# Patient Record
Sex: FEMALE | Race: WHITE | Employment: UNEMPLOYED | URBAN - NONMETROPOLITAN AREA
[De-identification: names, ages, dates, MRNs, and addresses within clinical notes are randomized per-mention and may not be internally consistent; named-entity substitution may affect disease eponyms.]

---

## 2022-11-18 ENCOUNTER — HOSPITAL ENCOUNTER (INPATIENT)
Age: 57
LOS: 1 days | Discharge: HOME | DRG: 390 | End: 2022-11-19
Attending: EMERGENCY MEDICINE | Admitting: INTERNAL MEDICINE
Payer: COMMERCIAL

## 2022-11-18 ENCOUNTER — APPOINTMENT (OUTPATIENT)
Dept: CT IMAGING | Age: 57
DRG: 390 | End: 2022-11-18
Payer: COMMERCIAL

## 2022-11-18 DIAGNOSIS — K56.609 SMALL BOWEL OBSTRUCTION (HCC): Primary | ICD-10-CM

## 2022-11-18 LAB
ABSOLUTE EOS #: <0.03 K/UL (ref 0–0.44)
ABSOLUTE IMMATURE GRANULOCYTE: <0.03 K/UL (ref 0–0.3)
ABSOLUTE LYMPH #: 1.08 K/UL (ref 1.1–3.7)
ABSOLUTE MONO #: 0.76 K/UL (ref 0.1–1.2)
ALBUMIN SERPL-MCNC: 4.7 G/DL (ref 3.5–5.2)
ALBUMIN/GLOBULIN RATIO: 1.7 (ref 1–2.5)
ALP BLD-CCNC: 104 U/L (ref 35–104)
ALT SERPL-CCNC: 9 U/L (ref 5–33)
AMYLASE: 39 U/L (ref 28–100)
ANION GAP SERPL CALCULATED.3IONS-SCNC: 13 MMOL/L (ref 9–17)
AST SERPL-CCNC: 17 U/L
BACTERIA: ABNORMAL
BASOPHILS # BLD: 0 % (ref 0–2)
BASOPHILS ABSOLUTE: 0.04 K/UL (ref 0–0.2)
BILIRUB SERPL-MCNC: 0.4 MG/DL (ref 0.3–1.2)
BILIRUBIN DIRECT: 0.1 MG/DL
BILIRUBIN URINE: NEGATIVE
BILIRUBIN, INDIRECT: 0.3 MG/DL (ref 0–1)
BUN BLDV-MCNC: 13 MG/DL (ref 6–20)
CALCIUM SERPL-MCNC: 10.6 MG/DL (ref 8.6–10.4)
CHLORIDE BLD-SCNC: 98 MMOL/L (ref 98–107)
CO2: 29 MMOL/L (ref 20–31)
CREAT SERPL-MCNC: 1.08 MG/DL (ref 0.5–0.9)
EKG ATRIAL RATE: 58 BPM
EKG P AXIS: 38 DEGREES
EKG P-R INTERVAL: 128 MS
EKG Q-T INTERVAL: 460 MS
EKG QRS DURATION: 86 MS
EKG QTC CALCULATION (BAZETT): 451 MS
EKG R AXIS: 75 DEGREES
EKG T AXIS: 66 DEGREES
EKG VENTRICULAR RATE: 58 BPM
EOSINOPHILS RELATIVE PERCENT: 0 % (ref 1–4)
EPITHELIAL CELLS UA: ABNORMAL /HPF (ref 0–5)
FLU A ANTIGEN: NEGATIVE
FLU B ANTIGEN: NEGATIVE
GFR SERPL CREATININE-BSD FRML MDRD: >60 ML/MIN/1.73M2
GLUCOSE BLD-MCNC: 154 MG/DL (ref 70–99)
GLUCOSE URINE: NEGATIVE
HCT VFR BLD CALC: 42.7 % (ref 36.3–47.1)
HEMOGLOBIN: 14.4 G/DL (ref 11.9–15.1)
IMMATURE GRANULOCYTES: 0 %
KETONES, URINE: NEGATIVE
LACTIC ACID: 1.8 MMOL/L (ref 0.5–2.2)
LEUKOCYTE ESTERASE, URINE: NEGATIVE
LIPASE: 17 U/L (ref 13–60)
LYMPHOCYTES # BLD: 9 % (ref 24–43)
MCH RBC QN AUTO: 30 PG (ref 25.2–33.5)
MCHC RBC AUTO-ENTMCNC: 33.7 G/DL (ref 25.2–33.5)
MCV RBC AUTO: 89 FL (ref 82.6–102.9)
MONOCYTES # BLD: 6 % (ref 3–12)
NITRITE, URINE: NEGATIVE
PDW BLD-RTO: 13.3 % (ref 11.8–14.4)
PH UA: 7.5 (ref 5–6)
PLATELET # BLD: 392 K/UL (ref 138–453)
PMV BLD AUTO: 10.4 FL (ref 8.1–13.5)
POTASSIUM SERPL-SCNC: 3.8 MMOL/L (ref 3.7–5.3)
PROTEIN UA: NEGATIVE
RBC # BLD: 4.8 M/UL (ref 3.95–5.11)
RBC UA: ABNORMAL /HPF (ref 0–4)
SARS-COV-2, RAPID: NOT DETECTED
SEG NEUTROPHILS: 84 % (ref 36–65)
SEGMENTED NEUTROPHILS ABSOLUTE COUNT: 10.12 K/UL (ref 1.5–8.1)
SODIUM BLD-SCNC: 140 MMOL/L (ref 135–144)
SPECIFIC GRAVITY UA: 1.01 (ref 1.01–1.02)
SPECIMEN DESCRIPTION: NORMAL
TOTAL PROTEIN: 7.5 G/DL (ref 6.4–8.3)
TROPONIN, HIGH SENSITIVITY: 9 NG/L (ref 0–14)
URINE HGB: NEGATIVE
UROBILINOGEN, URINE: NORMAL
WBC # BLD: 12 K/UL (ref 3.5–11.3)
WBC UA: ABNORMAL /HPF (ref 0–4)

## 2022-11-18 PROCEDURE — 6360000002 HC RX W HCPCS: Performed by: EMERGENCY MEDICINE

## 2022-11-18 PROCEDURE — 99285 EMERGENCY DEPT VISIT HI MDM: CPT

## 2022-11-18 PROCEDURE — 84484 ASSAY OF TROPONIN QUANT: CPT

## 2022-11-18 PROCEDURE — 94640 AIRWAY INHALATION TREATMENT: CPT

## 2022-11-18 PROCEDURE — 82248 BILIRUBIN DIRECT: CPT

## 2022-11-18 PROCEDURE — 2580000003 HC RX 258: Performed by: EMERGENCY MEDICINE

## 2022-11-18 PROCEDURE — 2709999900 CT ABDOMEN PELVIS W IV CONTRAST

## 2022-11-18 PROCEDURE — 83690 ASSAY OF LIPASE: CPT

## 2022-11-18 PROCEDURE — 99222 1ST HOSP IP/OBS MODERATE 55: CPT | Performed by: SURGERY

## 2022-11-18 PROCEDURE — 6370000000 HC RX 637 (ALT 250 FOR IP)

## 2022-11-18 PROCEDURE — 81001 URINALYSIS AUTO W/SCOPE: CPT

## 2022-11-18 PROCEDURE — 1200000000 HC SEMI PRIVATE

## 2022-11-18 PROCEDURE — 99222 1ST HOSP IP/OBS MODERATE 55: CPT | Performed by: INTERNAL MEDICINE

## 2022-11-18 PROCEDURE — 6360000004 HC RX CONTRAST MEDICATION: Performed by: EMERGENCY MEDICINE

## 2022-11-18 PROCEDURE — 96374 THER/PROPH/DIAG INJ IV PUSH: CPT

## 2022-11-18 PROCEDURE — 83605 ASSAY OF LACTIC ACID: CPT

## 2022-11-18 PROCEDURE — 85025 COMPLETE CBC W/AUTO DIFF WBC: CPT

## 2022-11-18 PROCEDURE — 87635 SARS-COV-2 COVID-19 AMP PRB: CPT

## 2022-11-18 PROCEDURE — 82150 ASSAY OF AMYLASE: CPT

## 2022-11-18 PROCEDURE — 93005 ELECTROCARDIOGRAM TRACING: CPT | Performed by: EMERGENCY MEDICINE

## 2022-11-18 PROCEDURE — 6370000000 HC RX 637 (ALT 250 FOR IP): Performed by: INTERNAL MEDICINE

## 2022-11-18 PROCEDURE — 87804 INFLUENZA ASSAY W/OPTIC: CPT

## 2022-11-18 PROCEDURE — 96375 TX/PRO/DX INJ NEW DRUG ADDON: CPT

## 2022-11-18 PROCEDURE — 6360000002 HC RX W HCPCS: Performed by: INTERNAL MEDICINE

## 2022-11-18 PROCEDURE — 94760 N-INVAS EAR/PLS OXIMETRY 1: CPT

## 2022-11-18 PROCEDURE — 2580000003 HC RX 258: Performed by: INTERNAL MEDICINE

## 2022-11-18 PROCEDURE — 80053 COMPREHEN METABOLIC PANEL: CPT

## 2022-11-18 PROCEDURE — 36415 COLL VENOUS BLD VENIPUNCTURE: CPT

## 2022-11-18 RX ORDER — SODIUM CHLORIDE 0.9 % (FLUSH) 0.9 %
10 SYRINGE (ML) INJECTION EVERY 12 HOURS SCHEDULED
Status: DISCONTINUED | OUTPATIENT
Start: 2022-11-18 | End: 2022-11-19 | Stop reason: HOSPADM

## 2022-11-18 RX ORDER — SODIUM CHLORIDE 0.9 % (FLUSH) 0.9 %
10 SYRINGE (ML) INJECTION PRN
Status: DISCONTINUED | OUTPATIENT
Start: 2022-11-18 | End: 2022-11-19 | Stop reason: HOSPADM

## 2022-11-18 RX ORDER — SODIUM CHLORIDE FOR INHALATION 0.9 %
3 VIAL, NEBULIZER (ML) INHALATION EVERY 8 HOURS PRN
Status: DISCONTINUED | OUTPATIENT
Start: 2022-11-18 | End: 2022-11-19 | Stop reason: HOSPADM

## 2022-11-18 RX ORDER — ENOXAPARIN SODIUM 100 MG/ML
40 INJECTION SUBCUTANEOUS DAILY
Status: DISCONTINUED | OUTPATIENT
Start: 2022-11-18 | End: 2022-11-19 | Stop reason: HOSPADM

## 2022-11-18 RX ORDER — SODIUM CHLORIDE 9 MG/ML
INJECTION, SOLUTION INTRAVENOUS PRN
Status: DISCONTINUED | OUTPATIENT
Start: 2022-11-18 | End: 2022-11-19 | Stop reason: HOSPADM

## 2022-11-18 RX ORDER — 0.9 % SODIUM CHLORIDE 0.9 %
1000 INTRAVENOUS SOLUTION INTRAVENOUS ONCE
Status: COMPLETED | OUTPATIENT
Start: 2022-11-18 | End: 2022-11-18

## 2022-11-18 RX ORDER — LEVALBUTEROL 1.25 MG/.5ML
1.25 SOLUTION, CONCENTRATE RESPIRATORY (INHALATION) EVERY 8 HOURS PRN
Status: DISCONTINUED | OUTPATIENT
Start: 2022-11-18 | End: 2022-11-19 | Stop reason: HOSPADM

## 2022-11-18 RX ORDER — ACETAMINOPHEN 325 MG/1
650 TABLET ORAL EVERY 4 HOURS PRN
Status: DISCONTINUED | OUTPATIENT
Start: 2022-11-18 | End: 2022-11-19 | Stop reason: HOSPADM

## 2022-11-18 RX ORDER — SODIUM CHLORIDE 9 MG/ML
INJECTION, SOLUTION INTRAVENOUS CONTINUOUS
Status: DISCONTINUED | OUTPATIENT
Start: 2022-11-18 | End: 2022-11-19 | Stop reason: HOSPADM

## 2022-11-18 RX ORDER — IPRATROPIUM BROMIDE AND ALBUTEROL SULFATE 2.5; .5 MG/3ML; MG/3ML
1 SOLUTION RESPIRATORY (INHALATION)
Status: DISCONTINUED | OUTPATIENT
Start: 2022-11-18 | End: 2022-11-18

## 2022-11-18 RX ORDER — SODIUM CHLORIDE FOR INHALATION 0.9 %
3 VIAL, NEBULIZER (ML) INHALATION
Status: DISCONTINUED | OUTPATIENT
Start: 2022-11-18 | End: 2022-11-19 | Stop reason: HOSPADM

## 2022-11-18 RX ORDER — ONDANSETRON 2 MG/ML
4 INJECTION INTRAMUSCULAR; INTRAVENOUS EVERY 6 HOURS PRN
Status: DISCONTINUED | OUTPATIENT
Start: 2022-11-18 | End: 2022-11-18

## 2022-11-18 RX ORDER — ONDANSETRON 2 MG/ML
8 INJECTION INTRAMUSCULAR; INTRAVENOUS EVERY 6 HOURS PRN
Status: DISCONTINUED | OUTPATIENT
Start: 2022-11-18 | End: 2022-11-19 | Stop reason: HOSPADM

## 2022-11-18 RX ORDER — FENTANYL CITRATE 50 UG/ML
50 INJECTION, SOLUTION INTRAMUSCULAR; INTRAVENOUS ONCE
Status: COMPLETED | OUTPATIENT
Start: 2022-11-18 | End: 2022-11-18

## 2022-11-18 RX ORDER — ONDANSETRON 2 MG/ML
4 INJECTION INTRAMUSCULAR; INTRAVENOUS ONCE
Status: COMPLETED | OUTPATIENT
Start: 2022-11-18 | End: 2022-11-18

## 2022-11-18 RX ORDER — NICOTINE 21 MG/24HR
1 PATCH, TRANSDERMAL 24 HOURS TRANSDERMAL DAILY
Status: DISCONTINUED | OUTPATIENT
Start: 2022-11-18 | End: 2022-11-19 | Stop reason: HOSPADM

## 2022-11-18 RX ADMIN — SODIUM CHLORIDE 1000 ML: 9 INJECTION, SOLUTION INTRAVENOUS at 09:21

## 2022-11-18 RX ADMIN — IPRATROPIUM BROMIDE AND ALBUTEROL SULFATE 1 AMPULE: .5; 2.5 SOLUTION RESPIRATORY (INHALATION) at 16:18

## 2022-11-18 RX ADMIN — SODIUM CHLORIDE: 9 INJECTION, SOLUTION INTRAVENOUS at 20:39

## 2022-11-18 RX ADMIN — IOPAMIDOL 100 ML: 755 INJECTION, SOLUTION INTRAVENOUS at 09:41

## 2022-11-18 RX ADMIN — FENTANYL CITRATE 50 MCG: 50 INJECTION, SOLUTION INTRAMUSCULAR; INTRAVENOUS at 10:33

## 2022-11-18 RX ADMIN — HYDROMORPHONE HYDROCHLORIDE 1 MG: 1 INJECTION, SOLUTION INTRAMUSCULAR; INTRAVENOUS; SUBCUTANEOUS at 16:37

## 2022-11-18 RX ADMIN — ENOXAPARIN SODIUM 40 MG: 100 INJECTION SUBCUTANEOUS at 14:14

## 2022-11-18 RX ADMIN — PHENOL 1 SPRAY: 1.5 LIQUID ORAL at 20:31

## 2022-11-18 RX ADMIN — ONDANSETRON 4 MG: 2 INJECTION INTRAMUSCULAR; INTRAVENOUS at 09:21

## 2022-11-18 RX ADMIN — SODIUM CHLORIDE: 9 INJECTION, SOLUTION INTRAVENOUS at 14:14

## 2022-11-18 ASSESSMENT — PAIN SCALES - GENERAL
PAINLEVEL_OUTOF10: 7
PAINLEVEL_OUTOF10: 8
PAINLEVEL_OUTOF10: 0
PAINLEVEL_OUTOF10: 5
PAINLEVEL_OUTOF10: 3
PAINLEVEL_OUTOF10: 8

## 2022-11-18 ASSESSMENT — ENCOUNTER SYMPTOMS
CONSTIPATION: 0
EYE PAIN: 0
VOMITING: 1
SHORTNESS OF BREATH: 0
COUGH: 0
NAUSEA: 1
BACK PAIN: 0
BLOOD IN STOOL: 0
DIARRHEA: 0
ABDOMINAL PAIN: 1

## 2022-11-18 ASSESSMENT — PAIN - FUNCTIONAL ASSESSMENT
PAIN_FUNCTIONAL_ASSESSMENT: ACTIVITIES ARE NOT PREVENTED
PAIN_FUNCTIONAL_ASSESSMENT: ACTIVITIES ARE NOT PREVENTED
PAIN_FUNCTIONAL_ASSESSMENT: 0-10

## 2022-11-18 ASSESSMENT — PAIN DESCRIPTION - FREQUENCY
FREQUENCY: CONTINUOUS
FREQUENCY: CONTINUOUS

## 2022-11-18 ASSESSMENT — PAIN DESCRIPTION - LOCATION
LOCATION: ABDOMEN
LOCATION: THROAT
LOCATION: ABDOMEN

## 2022-11-18 ASSESSMENT — PAIN DESCRIPTION - DESCRIPTORS
DESCRIPTORS: DISCOMFORT
DESCRIPTORS: SHARP
DESCRIPTORS: SHARP

## 2022-11-18 ASSESSMENT — PAIN DESCRIPTION - ONSET
ONSET: ON-GOING
ONSET: SUDDEN

## 2022-11-18 ASSESSMENT — PAIN DESCRIPTION - ORIENTATION
ORIENTATION: MID
ORIENTATION: MID

## 2022-11-18 ASSESSMENT — PAIN DESCRIPTION - PAIN TYPE
TYPE: ACUTE PAIN
TYPE: ACUTE PAIN

## 2022-11-18 NOTE — CONSULTS
General Surgery   Consultation        PATIENT NAME: Patrica Castrejon   YOB: 1965    ADMISSION DATE: 11/18/2022  8:38 AM     Admitting Provider: Yasmine Abarca Physician: Kim Ibanez DATE: 11/18/2022    Consult Regarding:  Small bowel obstruction    HISTORY OF PRESENT ILLNESS:  The patient is a 65 y.o. female  who presents with complaints of abdominal pain and emesis. Pt reports while she was eating dinner last night she began to have crampy but sharp mid abdominal pain and soon after began having nausea and non bloody emesis. This persisted overnight and was not improving so she came to ER for evaluation. Prior to yesterday she reports she was in her usual state health. Denies any recent changes in bowel movements, no recent known sick contacts, no prior hx of bowel obstruction. She does report hx of partial hysterectomy several years ago but no other surgical hx. Past Medical History:    History reviewed. No pertinent past medical history. Past Surgical History:    History reviewed. No pertinent surgical history. Medications Prior to Admission:   No medications prior to admission.     Allergies:  Aleve [naproxen] and Sudafed [pseudoephedrine]    Social History:   Social History     Socioeconomic History    Marital status:      Spouse name: Not on file    Number of children: Not on file    Years of education: Not on file    Highest education level: Not on file   Occupational History    Not on file   Tobacco Use    Smoking status: Every Day     Packs/day: 1.00     Years: 25.00     Pack years: 25.00     Types: Cigarettes    Smokeless tobacco: Not on file   Vaping Use    Vaping Use: Never used   Substance and Sexual Activity    Alcohol use: Not Currently    Drug use: Yes     Types: Marijuana [place])    Sexual activity: Not Currently   Other Topics Concern    Not on file   Social History Narrative    Not on file     Social Determinants of Health     Financial Resource Strain: Not on file   Food Insecurity: Not on file   Transportation Needs: Not on file   Physical Activity: Not on file   Stress: Not on file   Social Connections: Not on file   Intimate Partner Violence: Not on file   Housing Stability: Not on file       Family History:   History reviewed. No pertinent family history. REVIEW OF SYSTEMS:    CONSTITUTIONAL:  No recent weight gain/loss. Energy level normal for pt. HEENT:  negative  CARDIOVASCULAR:  No chest pain  GASTROINTESTINAL:  per HPI  GENITOURINARY:  No dysuria  HEMATOLOGIC/LYMPHATIC:  No easy bruising. No history of cancer  ENDOCRINE: negative  Review of systems negative unless above. PHYSICAL EXAM:    VITALS:  BP (!) 180/93   Pulse 60   Temp 98 °F (36.7 °C) (Tympanic)   Resp 17   Ht 5' 5\" (1.651 m)   Wt 125 lb (56.7 kg)   SpO2 97%   BMI 20.80 kg/m²   INTAKE/OUTPUT:     Intake/Output Summary (Last 24 hours) at 11/18/2022 1622  Last data filed at 11/18/2022 1017  Gross per 24 hour   Intake --   Output 450 ml   Net -450 ml       CONSTITUTIONAL:  awake, alert, not distressed   ENT:  normocephalic/atraumatic, without obvious abnormality  NECK:  supple, symmetrical, trachea midline   LUNGS:  CTA bilaterally  CARDIOVASCULAR:  Regular Rate and Rhythm   ABDOMEN: soft, non distended, mild tenderness to palpation in upper abdomen without guarding or rebound, hypoactive bowel sounds. Well healed lower abd transverse incision.     MUSCULOSKELETAL:  negative, there is not obvious somatic dysfunction  NEUROLOGIC:  Mental Status Exam:  Level of Alertness:   alert  Orientation:   oriented to person, place, and time    CBC:   Lab Results   Component Value Date/Time    WBC 12.0 11/18/2022 09:22 AM    RBC 4.80 11/18/2022 09:22 AM    HGB 14.4 11/18/2022 09:22 AM    HCT 42.7 11/18/2022 09:22 AM    MCV 89.0 11/18/2022 09:22 AM    MCH 30.0 11/18/2022 09:22 AM    MCHC 33.7 11/18/2022 09:22 AM    RDW 13.3 11/18/2022 09:22 AM     11/18/2022 09:22 AM    MPV 10.4 11/18/2022 09:22 AM     CMP:    Lab Results   Component Value Date/Time     11/18/2022 09:22 AM    K 3.8 11/18/2022 09:22 AM    CL 98 11/18/2022 09:22 AM    CO2 29 11/18/2022 09:22 AM    BUN 13 11/18/2022 09:22 AM    CREATININE 1.08 11/18/2022 09:22 AM    LABGLOM >60 11/18/2022 09:22 AM    GLUCOSE 154 11/18/2022 09:22 AM    PROT 7.5 11/18/2022 09:22 AM    LABALBU 4.7 11/18/2022 09:22 AM    CALCIUM 10.6 11/18/2022 09:22 AM    BILITOT 0.4 11/18/2022 09:22 AM    ALKPHOS 104 11/18/2022 09:22 AM    AST 17 11/18/2022 09:22 AM    ALT 9 11/18/2022 09:22 AM     BMP:    Lab Results   Component Value Date/Time     11/18/2022 09:22 AM    K 3.8 11/18/2022 09:22 AM    CL 98 11/18/2022 09:22 AM    CO2 29 11/18/2022 09:22 AM    BUN 13 11/18/2022 09:22 AM    LABALBU 4.7 11/18/2022 09:22 AM    CREATININE 1.08 11/18/2022 09:22 AM    CALCIUM 10.6 11/18/2022 09:22 AM    LABGLOM >60 11/18/2022 09:22 AM    GLUCOSE 154 11/18/2022 09:22 AM     Hepatic Function Panel:    Lab Results   Component Value Date/Time    ALKPHOS 104 11/18/2022 09:22 AM    ALT 9 11/18/2022 09:22 AM    AST 17 11/18/2022 09:22 AM    PROT 7.5 11/18/2022 09:22 AM    BILITOT 0.4 11/18/2022 09:22 AM    BILIDIR 0.1 11/18/2022 09:22 AM    IBILI 0.3 11/18/2022 09:22 AM    LABALBU 4.7 11/18/2022 09:22 AM     AMYLASE:    Lab Results   Component Value Date/Time    AMYLASE 39 11/18/2022 09:22 AM     LIPASE:    Lab Results   Component Value Date/Time    LIPASE 17 11/18/2022 09:22 AM       Pertinent Radiology:  CT abd/pel images reviewed and read noted. Dilated loops of bowel with no clear transition point. Distal small bowel appears decompressed. ASSESSMENT   66 yo female presenting with small bowel obstruction. Suspect obstruction secondary to adhesions based on hx. PLAN    Pt does not have acute surgical abdomen. Will attempt conservative management. I have discussed that should she fail conservative management or have worsening may need surgical intervention. She understands and is agreeable to surgery if needed. Keep NPO. [id number] Krystyna Subramanian for some ice chips. Keep NG to LIWS  Symptom control PRN.     XR abd tomorrow to monitor progression        Electronically signed by Chloe Santana DO  on 11/18/2022 at 4:22 PM

## 2022-11-18 NOTE — H&P
HOSPITALIST ADMISSION H&P      REASON FOR ADMISSION:     SBO---jejunum  ESTIMATED LENGTH OF STAY:   > 2 midnights---2-4 days    ATTENDING/ADMITTING PHYSICIAN: Lilly Frausto MD MD  PCP: No primary care provider on file. HISTORY OF PRESENT ILLNESS:      The patient is a 65 y.o. female patient of No primary care provider on file. who presents with sudden onset of abdominal pain--nausea--vomiting---11.18.2022--no exacerbating events of ameliorating event---has had abdominal previously and abdominal surgeries, but no event of the magnitude as this event. CT abdomen-pelvis--11.18.2022--SBO involving the jejunum--multiple dilated loops of bowel up to 3.5 cm. COPD--asthma--tobacco abuse---marijuana smoking    EKD with septal changes, but prior cardiac history denied     HTN----elevated BP most likely due to pain    In ER--NGT placed --> some relief       See below for additional PMH. Patient felk-vnysmqdetg-hdiiuqih-available records reviewed, including, but not limited to,  ER reports--labs--imaging---EKGs--office records---personal notes. History reviewed. See subnote below incorporated by reference herein        History reviewed. See subnote below incorporated by reference   herein    Medications Prior to Admission:    No medications prior to admission. Allergies:    Aleve [naproxen] and Sudafed [pseudoephedrine]    Social History:    reports that she has been smoking cigarettes. She has a 25.00 pack-year smoking history. She does not have any smokeless tobacco history on file. She reports that she does not currently use alcohol. She reports current drug use. Drug: Marijuana [place]).     Family History:   Patient adopted and raised by grandparents---biological parents history unknown---possible black lung---daughter--substance abuse    REVIEW OF SYSTEMS:  See HPI and problem list; otherwise no other new complaints with respect to HEENT, neck, pulmonary, coronary, GI, , endocrine, musculoskeletal, immune system/connective tissue disease, hematologic, neuropsych, skin, lymphatics, or malignancies. PHYSICAL EXAM:  Vitals:  BP (!) 180/93   Pulse 60   Temp 98 °F (36.7 °C) (Tympanic)   Resp 17   Ht 5' 5\" (1.651 m)   Wt 125 lb (56.7 kg)   SpO2 97%   BMI 20.80 kg/m²     HEENT: Normocephalic and Atraumatic  Neck: Supple, No Masses, Tenderness, Nodularity, and No Lymphadenopathy  Chest/Lungs: Clear to Auscultation without Rales, Rhonchi, or Wheezes  Cardiac: Regular Rate and Rhythm  GI/Abdomen:  Bowel Sounds Absent and Tenderness---no localizing or referred tenderness----no rebound  : Not examined  EXT/Skin: No Edema, No Cyanosis, and No Clubbing---multiple tattoos--none infected appearing  Neuro: Alert and Oriented, to Person, to Time, to Place, to Situation, and No Localizing Signs/Symptoms        LABS:    CBC with Differential:    Lab Results   Component Value Date/Time    WBC 12.0 11/18/2022 09:22 AM    RBC 4.80 11/18/2022 09:22 AM    HGB 14.4 11/18/2022 09:22 AM    HCT 42.7 11/18/2022 09:22 AM     11/18/2022 09:22 AM    MCV 89.0 11/18/2022 09:22 AM    MCH 30.0 11/18/2022 09:22 AM    MCHC 33.7 11/18/2022 09:22 AM    RDW 13.3 11/18/2022 09:22 AM    LYMPHOPCT 9 11/18/2022 09:22 AM    MONOPCT 6 11/18/2022 09:22 AM    BASOPCT 0 11/18/2022 09:22 AM    MONOSABS 0.76 11/18/2022 09:22 AM    LYMPHSABS 1.08 11/18/2022 09:22 AM    EOSABS <0.03 11/18/2022 09:22 AM    BASOSABS 0.04 11/18/2022 09:22 AM     BMP:    Lab Results   Component Value Date/Time     11/18/2022 09:22 AM    K 3.8 11/18/2022 09:22 AM    CL 98 11/18/2022 09:22 AM    CO2 29 11/18/2022 09:22 AM    BUN 13 11/18/2022 09:22 AM    LABALBU 4.7 11/18/2022 09:22 AM    CREATININE 1.08 11/18/2022 09:22 AM    CALCIUM 10.6 11/18/2022 09:22 AM    LABGLOM >60 11/18/2022 09:22 AM    GLUCOSE 154 11/18/2022 09:22 AM       ASSESSMENT:      Patient Active Problem List   Diagnosis    Small bowel obstruction ([facility] [address]     Heath Petit  57  WF   [abimael a Janell Patches, NP;   Mariely Prescott, GS]  FULL CODE       [place]  COVID-19--NEGATIVE    Anti-infectives:  ----    SBO---small bowel obstruction---2022---leukocytosis               CT abdomen-pelvis--2022----SBO involving the jejunum---multiple dilated loops                                      jejunum up to 3.5--left adrenal nodule--likely adenoma  ASCVD               EKG----2022---sinus bradycardia---58--old appearing septal infarction  Hypercalcemia---2022----ca = 10.6  [Normal <= 10.4]    COPD  Asthma   HTN  CKD--Stage 2  GERD  Depression--anxiety--insomnia   Marijuana smoking   Tobacco abuse  PMH:    hematochezia--BRBPR,  dysphagia, back pain, rectal pain  PSH:     partial hysterectomy, right shoulder, EGD--biopsies--dilatation--,  x 2,                colonoscopy--, EGD--    Allergies:     Aleve--Naprosyn--dyspnea, Sudafed--pseudoephedrine       Code Status:  FULL CODE  OARRS---2022    PLAN:       SBO---NGT--General Surgery---scant ice chips to moisten mouth     Home medications reviewed  3.       See orders     Note that over 50 minutes was spent in evaluation of the patient, review of the chart and pertinent records, discussion with family/staff, etc    Charisse Agarwal MD MD  3:46 PM  2022

## 2022-11-18 NOTE — ED PROVIDER NOTES
[id number] [facility]  eMERGENCY dEPARTMENT eNCOUnter      Pt Name: Jeffry Rodriguez  MRN: [id number]  [place] 1965  Date of evaluation: 11/18/2022      CHIEF COMPLAINT       Chief Complaint   Patient presents with    Abdominal Pain    Emesis     \"Started last night \"         HISTORY OF PRESENT ILLNESS    Jeffry Rodriguez is a 65 y.o. female who presents with chief complaint of abdominal pain upper abdomen she associate with nausea vomiting she has had some chills no diarrhea she states it feels similar to hiatal hernia pain she has had before but worse she is also had a bit of a cough and body aches has not been able to get back to sleep since last night      REVIEW OF SYSTEMS         Review of Systems   Constitutional:  Positive for activity change, appetite change and chills. Negative for fever. HENT:  Negative for congestion and ear pain. Eyes:  Negative for pain and visual disturbance. Respiratory:  Negative for cough and shortness of breath. Cardiovascular:  Negative for chest pain, palpitations and leg swelling. Gastrointestinal:  Positive for abdominal pain, nausea and vomiting. Negative for blood in stool, constipation and diarrhea. Endocrine: Negative for polydipsia and polyuria. Genitourinary:  Negative for difficulty urinating, dysuria, frequency, vaginal bleeding and vaginal discharge. Musculoskeletal:  Negative for back pain, joint swelling, myalgias, neck pain and neck stiffness. Skin:  Negative for rash. Neurological:  Negative for dizziness, weakness and headaches. Hematological:  Negative for adenopathy. Does not bruise/bleed easily. Psychiatric/Behavioral:  Negative for confusion, self-injury and suicidal ideas. PAST MEDICAL HISTORY    has no past medical history on file. SURGICAL HISTORY      has no past surgical history on file.     CURRENT MEDICATIONS       Previous Medications    No medications on file       ALLERGIES     is allergic to aleve [naproxen] and sudafed [pseudoephedrine]. FAMILY HISTORY     has no family status information on file. family history is not on file. SOCIAL HISTORY      reports that she has been smoking cigarettes. She has a 25.00 pack-year smoking history. She does not have any smokeless tobacco history on file. She reports that she does not currently use alcohol. She reports current drug use. Drug: Marijuana [place]). PHYSICAL EXAM     INITIAL VITALS:  height is 5' 5\" (1.651 m) and weight is 125 lb (56.7 kg). Her tympanic temperature is 97.9 °F (36.6 °C). Her blood pressure is 180/94 (abnormal) and her pulse is 57. Her respiration is 20 and oxygen saturation is 100%. Physical Exam  Constitutional:       Appearance: She is well-developed. She is ill-appearing. Comments: Patient curled in the fetal position actively vomiting   HENT:      Head: Normocephalic and atraumatic. Right Ear: External ear normal.      Left Ear: External ear normal.   Eyes:      Conjunctiva/sclera: Conjunctivae normal.      Pupils: Pupils are equal, round, and reactive to light. Cardiovascular:      Rate and Rhythm: Normal rate and regular rhythm. Pulmonary:      Effort: Pulmonary effort is normal.      Breath sounds: Normal breath sounds. Abdominal:      General: Bowel sounds are normal.      Palpations: Abdomen is soft. Tenderness: There is generalized abdominal tenderness and tenderness in the epigastric area. Musculoskeletal:         General: No tenderness. Cervical back: Normal range of motion. Skin:     General: Skin is warm and dry. Neurological:      General: No focal deficit present. Mental Status: She is alert and oriented to person, place, and time.    Psychiatric:         Behavior: Behavior normal.         DIFFERENTIAL DIAGNOSIS/ MDM:     Abdominal pain we will do work-up including CAT scan    DIAGNOSTIC RESULTS     EKG: All EKG's are interpreted by the Emergency Department Physician who either signs or Co-signs this chart in the absence of a cardiologist.    Sinus bradycardia rate of 58 bpm ID was 128 ms QRS durations 86 ms QT corrected 451 ms axis is 75 there is no acute ST or T wave changes seen  RADIOLOGY:   I directly visualized the following  images and reviewed the radiologist interpretations:     EXAMINATION:   CT OF THE ABDOMEN AND PELVIS WITH CONTRAST 11/18/2022 9:42 am       TECHNIQUE:   CT of the abdomen and pelvis was performed with the administration of   intravenous contrast. Multiplanar reformatted images are provided for review. Automated exposure control, iterative reconstruction, and/or weight based   adjustment of the mA/kV was utilized to reduce the radiation dose to as low   as reasonably achievable. COMPARISON:   None. HISTORY:   ORDERING SYSTEM PROVIDED HISTORY: Upper abdominal pain with nausea vomiting   inability to have a bowel movement   TECHNOLOGIST PROVIDED HISTORY:       Upper abdominal pain with nausea vomiting inability to have a bowel movement   Decision Support Exception - unselect if not a suspected or confirmed   emergency medical condition->Emergency Medical Condition (MA)   Reason for Exam: upper abd pain       FINDINGS:   Lower Chest: The lung bases are clear       Organs: Few scattered tiny hepatic hypodensities are too small to   characterize and are likely benign. No follow-up is recommended. The   gallbladder is normal.  The spleen and pancreas are normal.  There is a 1 cm   left adrenal nodule with Hounsfield unit measurements of 55. Right adrenal   gland is normal.  The kidneys are normal.       GI/Bowel: The stomach appears normal.  There are multiple dilated loops of   jejunum measuring up to 3.5 cm in diameter. No clear transition point is   identified. However, the majority of the ileum and colon are decompressed. Pelvis: The urinary bladder is normal.  Uterus is not visualized. Adnexal   structures appear normal.  No free fluid. Peritoneum/Retroperitoneum: The aorta is normal caliber. No lymphadenopathy       Bones/Soft Tissues: No suspicious osseous lesion           Impression   1. Small-bowel obstruction involving the jejunum. The transition point is   not clearly identified. 2.  Left adrenal nodule with imaging characteristics probably representing an   adenoma but cannot exclude a pheochromocytoma. Recommend biochemical lab   evaluation for pheochromocytoma. If lab values are normal 1 year follow-up   adrenal washout CT is recommended.                ED BEDSIDE ULTRASOUND:       LABS:  Labs Reviewed   CBC WITH AUTO DIFFERENTIAL - Abnormal; Notable for the following components:       Result Value    WBC 12.0 (*)     MCHC 33.7 (*)     Seg Neutrophils 84 (*)     Lymphocytes 9 (*)     Eosinophils % 0 (*)     Segs Absolute 10.12 (*)     Absolute Lymph # 1.08 (*)     All other components within normal limits   COMPREHENSIVE METABOLIC W/ BILI PROFILE W/ REFLEX TO MG - Abnormal; Notable for the following components:    Calcium 10.6 (*)     Creatinine 1.08 (*)     Glucose 154 (*)     All other components within normal limits   URINALYSIS WITH REFLEX TO CULTURE - Abnormal; Notable for the following components:    pH, UA 7.5 (*)     All other components within normal limits   MICROSCOPIC URINALYSIS - Abnormal; Notable for the following components:    Bacteria, UA 1+ (*)     All other components within normal limits   COVID-19, RAPID   RAPID INFLUENZA A/B ANTIGENS   LACTIC ACID   LIPASE   AMYLASE   TROPONIN           EMERGENCY DEPARTMENT COURSE:   Vitals:    Vitals:    11/18/22 0838 11/18/22 0949 11/18/22 1012 11/18/22 1036   BP: (!) 199/82 (!) 213/91 (!) 227/92 (!) 180/94   Pulse: 56 57 55 57   Resp: 22 20 18 20   Temp: 97.9 °F (36.6 °C)      TempSrc: Tympanic      SpO2: 100% 100% 100% 100%   Weight: 125 lb (56.7 kg)      Height: 5' 5\" (1.651 m)        -------------------------  BP: (!) 180/94, Temp: 97.9 °F (36.6 °C), Heart Rate: 57, Resp: 20        Re-evaluation Notes    Reexamined after placing the NG she is much improved large output    CRITICAL CARE:   IP CONSULT TO GENERAL SURGERY  IP CONSULT TO HOSPITALIST        CONSULTS: General surgery was discussed discussed case with Dr. Teresa Brambila he will see the patient patient      PROCEDURES:  None    FINAL IMPRESSION      1. Small bowel obstruction ([facility] [address]          DISPOSITION/PLAN   DISPOSITION Admitted    Condition on Disposition    Stable    PATIENT REFERRED TO:  No follow-up provider specified. DISCHARGE MEDICATIONS:  New Prescriptions    No medications on file       (Please note that portions of this note were completed with a voice recognition program.  Efforts were made to edit the dictations but occasionally words are mis-transcribed.)    Neha Griffin MD,, MD, F.A.A.E.M.   Attending Emergency Physician                           Neha Griffin MD  11/18/22 [id number]

## 2022-11-19 ENCOUNTER — APPOINTMENT (OUTPATIENT)
Dept: GENERAL RADIOLOGY | Age: 57
DRG: 390 | End: 2022-11-19
Payer: COMMERCIAL

## 2022-11-19 VITALS
TEMPERATURE: 98.3 F | WEIGHT: 125 LBS | SYSTOLIC BLOOD PRESSURE: 162 MMHG | HEART RATE: 85 BPM | RESPIRATION RATE: 14 BRPM | DIASTOLIC BLOOD PRESSURE: 83 MMHG | HEIGHT: 65 IN | OXYGEN SATURATION: 99 % | BODY MASS INDEX: 20.83 KG/M2

## 2022-11-19 LAB
ABSOLUTE EOS #: 0.06 K/UL (ref 0–0.44)
ABSOLUTE IMMATURE GRANULOCYTE: <0.03 K/UL (ref 0–0.3)
ABSOLUTE LYMPH #: 1.68 K/UL (ref 1.1–3.7)
ABSOLUTE MONO #: 0.64 K/UL (ref 0.1–1.2)
ANION GAP SERPL CALCULATED.3IONS-SCNC: 10 MMOL/L (ref 9–17)
BASOPHILS # BLD: 1 % (ref 0–2)
BASOPHILS ABSOLUTE: 0.05 K/UL (ref 0–0.2)
BUN BLDV-MCNC: 9 MG/DL (ref 6–20)
BUN/CREAT BLD: 11 (ref 9–20)
CALCIUM SERPL-MCNC: 9.1 MG/DL (ref 8.6–10.4)
CHLORIDE BLD-SCNC: 102 MMOL/L (ref 98–107)
CO2: 28 MMOL/L (ref 20–31)
CREAT SERPL-MCNC: 0.82 MG/DL (ref 0.5–0.9)
EOSINOPHILS RELATIVE PERCENT: 1 % (ref 1–4)
GFR SERPL CREATININE-BSD FRML MDRD: >60 ML/MIN/1.73M2
GLUCOSE BLD-MCNC: 92 MG/DL (ref 70–99)
HCT VFR BLD CALC: 38.8 % (ref 36.3–47.1)
HEMOGLOBIN: 12.8 G/DL (ref 11.9–15.1)
IMMATURE GRANULOCYTES: 0 %
LYMPHOCYTES # BLD: 19 % (ref 24–43)
MCH RBC QN AUTO: 30.2 PG (ref 25.2–33.5)
MCHC RBC AUTO-ENTMCNC: 33 G/DL (ref 25.2–33.5)
MCV RBC AUTO: 91.5 FL (ref 82.6–102.9)
MONOCYTES # BLD: 7 % (ref 3–12)
PDW BLD-RTO: 13.7 % (ref 11.8–14.4)
PLATELET # BLD: 321 K/UL (ref 138–453)
PMV BLD AUTO: 11 FL (ref 8.1–13.5)
POTASSIUM SERPL-SCNC: 3.6 MMOL/L (ref 3.7–5.3)
RBC # BLD: 4.24 M/UL (ref 3.95–5.11)
SEG NEUTROPHILS: 73 % (ref 36–65)
SEGMENTED NEUTROPHILS ABSOLUTE COUNT: 6.47 K/UL (ref 1.5–8.1)
SODIUM BLD-SCNC: 140 MMOL/L (ref 135–144)
WBC # BLD: 8.9 K/UL (ref 3.5–11.3)

## 2022-11-19 PROCEDURE — 6370000000 HC RX 637 (ALT 250 FOR IP): Performed by: INTERNAL MEDICINE

## 2022-11-19 PROCEDURE — 6360000002 HC RX W HCPCS: Performed by: INTERNAL MEDICINE

## 2022-11-19 PROCEDURE — 36415 COLL VENOUS BLD VENIPUNCTURE: CPT

## 2022-11-19 PROCEDURE — 99231 SBSQ HOSP IP/OBS SF/LOW 25: CPT | Performed by: SURGERY

## 2022-11-19 PROCEDURE — 74019 RADEX ABDOMEN 2 VIEWS: CPT

## 2022-11-19 PROCEDURE — 80048 BASIC METABOLIC PNL TOTAL CA: CPT

## 2022-11-19 PROCEDURE — 99239 HOSP IP/OBS DSCHRG MGMT >30: CPT | Performed by: FAMILY MEDICINE

## 2022-11-19 PROCEDURE — 85025 COMPLETE CBC W/AUTO DIFF WBC: CPT

## 2022-11-19 PROCEDURE — 2580000003 HC RX 258: Performed by: INTERNAL MEDICINE

## 2022-11-19 RX ADMIN — SODIUM CHLORIDE: 9 INJECTION, SOLUTION INTRAVENOUS at 10:23

## 2022-11-19 RX ADMIN — ENOXAPARIN SODIUM 40 MG: 100 INJECTION SUBCUTANEOUS at 09:29

## 2022-11-19 RX ADMIN — PHENOL 1 SPRAY: 1.5 LIQUID ORAL at 02:29

## 2022-11-19 RX ADMIN — SODIUM CHLORIDE: 9 INJECTION, SOLUTION INTRAVENOUS at 03:26

## 2022-11-19 ASSESSMENT — PAIN SCALES - GENERAL: PAINLEVEL_OUTOF10: 0

## 2022-11-19 NOTE — DISCHARGE SUMMARY
Hospitalist Discharge Summary    Fariba Ards  :  1965  MRN:  [id number]    Admit date:  2022  Discharge date:  22    Admitting Physician:  Roopa Reagan MD    Discharge Diagnoses:   Patient Active Problem List   Diagnosis    Small bowel obstruction [facility])        Admission Condition:  fair      Discharged Condition:  good    Hospital Course/Treatments   admitted with abdominal pain, pt was seen in ER and admitted with sbo, pt was kept npo and ng was inserted, pt did well bowel sounds heard, pt ng will be pulled, repeat x ray showed no obstruction, pt will be d/c home    Discharge Medications:         Medication List      You have not been prescribed any medications. Consults:  IP CONSULT TO GENERAL SURGERY  IP CONSULT TO HOSPITALIST    Significant Diagnostic Studies:  CT ABDOMEN PELVIS W IV CONTRAST Additional Contrast? None    Result Date: 2022  EXAMINATION: CT OF THE ABDOMEN AND PELVIS WITH CONTRAST 2022 9:42 am TECHNIQUE: CT of the abdomen and pelvis was performed with the administration of intravenous contrast. Multiplanar reformatted images are provided for review. Automated exposure control, iterative reconstruction, and/or weight based adjustment of the mA/kV was utilized to reduce the radiation dose to as low as reasonably achievable. COMPARISON: None. HISTORY: ORDERING SYSTEM PROVIDED HISTORY: Upper abdominal pain with nausea vomiting inability to have a bowel movement TECHNOLOGIST PROVIDED HISTORY: Upper abdominal pain with nausea vomiting inability to have a bowel movement Decision Support Exception - unselect if not a suspected or confirmed emergency medical condition->Emergency Medical Condition (MA) Reason for Exam: upper abd pain FINDINGS: Lower Chest: The lung bases are clear Organs: Few scattered tiny hepatic hypodensities are too small to characterize and are likely benign. No follow-up is recommended.   The gallbladder is normal.  The spleen and pancreas are normal.  There is a 1 cm left adrenal nodule with Hounsfield unit measurements of 55. Right adrenal gland is normal.  The kidneys are normal. GI/Bowel: The stomach appears normal.  There are multiple dilated loops of jejunum measuring up to 3.5 cm in diameter. No clear transition point is identified. However, the majority of the ileum and colon are decompressed. Pelvis: The urinary bladder is normal.  Uterus is not visualized. Adnexal structures appear normal.  No free fluid. Peritoneum/Retroperitoneum: The aorta is normal caliber. No lymphadenopathy Bones/Soft Tissues: No suspicious osseous lesion     1. Small-bowel obstruction involving the jejunum. The transition point is not clearly identified. 2.  Left adrenal nodule with imaging characteristics probably representing an adenoma but cannot exclude a pheochromocytoma. Recommend biochemical lab evaluation for pheochromocytoma. If lab values are normal 1 year follow-up adrenal washout CT is recommended. XR ABDOMEN (2 VIEWS)    Result Date: 11/19/2022  EXAMINATION: TWO X-RAY VIEWS OF THE ABDOMEN 11/19/2022 11:08 am COMPARISON: November 18, 2022 CT HISTORY: ORDERING SYSTEM PROVIDED HISTORY: SBO TECHNOLOGIST PROVIDED HISTORY: SBO Reason for Exam: SBO FINDINGS: Moderate colonic stool. No evidence of bowel obstruction. Enteric tube at the expected location of the proximal stomach. Trace pleural effusions or scarring at the costophrenic angles. Moderate colonic stool and bowel gas. No evidence of small bowel obstruction. Findings are likely significantly improved from recent CT on November 18, 2022. Enteric tube at the expected location of the proximal stomach. Disposition:   home    Discharge Instructions: Activity: activity as tolerated  Diet:  regular diet    Follow up with No primary care provider on file. in 1 weeks.     Signed:  Heath Bumpers, MD  11/19/2022, 11:50 AM    Time spent in discharge of this pt is more than 30 minutes in examination,evaluvation,  counseling and review of medication and discharge plan

## 2022-11-19 NOTE — PLAN OF CARE
Problem: Discharge Planning  Goal: Discharge to home or other facility with appropriate resources  11/18/2022 2355 by Hilario Cade RN  Outcome: Progressing  Flowsheets (Taken 11/18/2022 2026)  Discharge to home or other facility with appropriate resources:   Identify barriers to discharge with patient and caregiver   Arrange for needed discharge resources and transportation as appropriate   Identify discharge learning needs (meds, wound care, etc)   Refer to discharge planning if patient needs post-hospital services based on physician order or complex needs related to functional status, cognitive ability or social support system  11/18/2022 1332 by Jigar Giron RN  Outcome: Progressing     Problem: Pain  Goal: Verbalizes/displays adequate comfort level or baseline comfort level  11/18/2022 2355 by Hilario Cade RN  Outcome: Progressing  11/18/2022 1332 by Jigar Giron RN  Outcome: Progressing     Problem: ABCDS Injury Assessment  Goal: Absence of physical injury  11/18/2022 2355 by Hilario Cade RN  Outcome: Progressing  11/18/2022 1332 by Jigar Giron RN  Outcome: Progressing

## 2022-11-19 NOTE — PROGRESS NOTES
Hospital Day 2    SBO    Pain gone, passing flatus. Wants to go home. BP (!) 142/75   Pulse 67   Temp 97.8 °F (36.6 °C) (Tympanic)   Resp 14   Ht 5' 5\" (1.651 m)   Wt 125 lb (56.7 kg)   SpO2 96%   BMI 20.80 kg/m²     Alert, no distress  Lungs - CTA  Heart- RRR  Abd - soft, active bowel sound    Plain films show resolutions of SBO    IMP/PLAN  1) Clinical and radiologic resolution SBO  2) D/C NG and feed.   If tolerated she may be discharged

## 2022-11-19 NOTE — PLAN OF CARE
Problem: Discharge Planning  Goal: Discharge to home or other facility with appropriate resources  11/19/2022 0952 by Jcarlos Mata RN  Outcome: Progressing  11/18/2022 2355 by Gabriela Parker RN  Outcome: Progressing  Flowsheets (Taken 11/18/2022 2026)  Discharge to home or other facility with appropriate resources:   Identify barriers to discharge with patient and caregiver   Arrange for needed discharge resources and transportation as appropriate   Identify discharge learning needs (meds, wound care, etc)   Refer to discharge planning if patient needs post-hospital services based on physician order or complex needs related to functional status, cognitive ability or social support system     Problem: Pain  Goal: Verbalizes/displays adequate comfort level or baseline comfort level  11/19/2022 0952 by Jcarlos Mata RN  Outcome: Progressing  11/18/2022 2355 by Gabriela Parker RN  Outcome: Progressing     Problem: ABCDS Injury Assessment  Goal: Absence of physical injury  11/19/2022 0952 by Jcarlos Mata RN  Outcome: Progressing  11/18/2022 2355 by Gabriela Parker RN  Outcome: Progressing

## 2022-11-21 NOTE — PROGRESS NOTES
Physician Progress Note      PATIENT:               Silvino Chan  CSN #:                  [id number]  :                       1965  ADMIT DATE:       2022 8:38 AM  [address] DATE:        2022 1:25 PM  RESPONDING  PROVIDER #:        Smith Flanagan MD          QUERY TEXT:    Pt admitted with SBO. Noted documentation of \"Suspect obstruction secondary   to adhesions\" in [address] [place] [address] consult note . If possible, please document in   progress notes and discharge summary:    The medical record reflects the following:  Risk Factors: PMH of partial hysterectomy and hiatal hernia. Clinical Indicators: ED Provider Note : presents with chief complaint of   abdominal pain upper abdomen she associate with nausea, vomiting, and chills -   no diarrhea. she states it feels similar to hiatal hernia pain she has had   before but worse. GS Consult : Presenting with small bowel obstruction. Suspect obstruction secondary to adhesions based on hx. CT Abd/Pelvis :   Small-bowel obstruction involving the jejunum. The transition point is not   clearly identified. Treatment: Conservative management- GS consult, IVFs, NG to LIWS, NPO. Options provided:  -- SBO related to intestinal adhesions  -- SBO unrelated to intestinal adhesions, due to hiatal hernia  -- Defer to [address] [place] [address] consultant documentation regarding SBO secondary to adhesions  -- Other - I will add my own diagnosis  -- Disagree - Not applicable / Not valid  -- Disagree - Clinically unable to determine / Unknown  -- Refer to Clinical Documentation Reviewer    PROVIDER RESPONSE TEXT:    This patient has an SBO related to intestinal adhesions.     Query created by: Beth Lim on 2022 5:39 AM      Electronically signed by:  Smith Flanagan MD 2022 7:54 AM

## 2022-11-22 ENCOUNTER — TELEPHONE (OUTPATIENT)
Dept: FAMILY MEDICINE CLINIC | Age: 57
End: 2022-11-22

## 2022-11-22 NOTE — TELEPHONE ENCOUNTER
PCU folow up/dod 11-19/small bowel obstruction, pt is discharged from clinic so pt is only allowed a 1 time hospital follow up, called and attempted to book pt but unable to book in schedule, pt became very upset do due to being discharged, transferred pt to pt services to discuss her being discharged, please advise of appt when doing [facility] call.

## 2023-01-20 ENCOUNTER — OFFICE VISIT (OUTPATIENT)
Dept: FAMILY MEDICINE CLINIC | Age: 58
End: 2023-01-20
Payer: COMMERCIAL

## 2023-01-20 ENCOUNTER — HOSPITAL ENCOUNTER (OUTPATIENT)
Age: 58
Discharge: HOME | End: 2023-01-20
Payer: COMMERCIAL

## 2023-01-20 VITALS
SYSTOLIC BLOOD PRESSURE: 138 MMHG | HEART RATE: 100 BPM | DIASTOLIC BLOOD PRESSURE: 82 MMHG | WEIGHT: 121 LBS | HEIGHT: 64 IN | OXYGEN SATURATION: 98 % | BODY MASS INDEX: 20.66 KG/M2

## 2023-01-20 DIAGNOSIS — Z72.0 TOBACCO USE: ICD-10-CM

## 2023-01-20 DIAGNOSIS — Z00.00 HEALTHCARE MAINTENANCE: ICD-10-CM

## 2023-01-20 DIAGNOSIS — R73.09 ELEVATED GLUCOSE: ICD-10-CM

## 2023-01-20 DIAGNOSIS — Z87.891 PERSONAL HISTORY OF TOBACCO USE: ICD-10-CM

## 2023-01-20 DIAGNOSIS — F32.A ANXIETY AND DEPRESSION: ICD-10-CM

## 2023-01-20 DIAGNOSIS — F41.9 ANXIETY AND DEPRESSION: ICD-10-CM

## 2023-01-20 DIAGNOSIS — F32.A ANXIETY AND DEPRESSION: Primary | ICD-10-CM

## 2023-01-20 DIAGNOSIS — Z13.9 ENCOUNTER FOR SCREENING INVOLVING SOCIAL DETERMINANTS OF HEALTH (SDOH): ICD-10-CM

## 2023-01-20 DIAGNOSIS — D36.9 TUBULAR ADENOMA: ICD-10-CM

## 2023-01-20 DIAGNOSIS — F41.9 ANXIETY AND DEPRESSION: Primary | ICD-10-CM

## 2023-01-20 DIAGNOSIS — E27.8 ADRENAL NODULE (HCC): ICD-10-CM

## 2023-01-20 PROBLEM — M25.551 PAIN OF BOTH HIP JOINTS: Status: ACTIVE | Noted: 2018-09-12

## 2023-01-20 PROBLEM — K21.9 GASTROESOPHAGEAL REFLUX DISEASE: Status: ACTIVE | Noted: 2017-02-07

## 2023-01-20 PROBLEM — M54.9 BACKACHE: Status: ACTIVE | Noted: 2017-02-07

## 2023-01-20 PROBLEM — J44.9 CHRONIC OBSTRUCTIVE PULMONARY DISEASE (HCC): Status: ACTIVE | Noted: 2017-02-07

## 2023-01-20 PROBLEM — M25.552 PAIN OF BOTH HIP JOINTS: Status: ACTIVE | Noted: 2018-09-12

## 2023-01-20 LAB
ALBUMIN SERPL-MCNC: 4.3 G/DL (ref 3.5–5.2)
ALBUMIN/GLOBULIN RATIO: 1.7 (ref 1–2.5)
ALP BLD-CCNC: 86 U/L (ref 35–104)
ALT SERPL-CCNC: 10 U/L (ref 5–33)
ANION GAP SERPL CALCULATED.3IONS-SCNC: 10 MMOL/L (ref 9–17)
AST SERPL-CCNC: 14 U/L
BILIRUB SERPL-MCNC: 0.2 MG/DL (ref 0.3–1.2)
BUN BLDV-MCNC: 9 MG/DL (ref 6–20)
BUN/CREAT BLD: 10 (ref 9–20)
CALCIUM SERPL-MCNC: 9.7 MG/DL (ref 8.6–10.4)
CHLORIDE BLD-SCNC: 100 MMOL/L (ref 98–107)
CO2: 29 MMOL/L (ref 20–31)
CREAT SERPL-MCNC: 0.87 MG/DL (ref 0.5–0.9)
GFR SERPL CREATININE-BSD FRML MDRD: >60 ML/MIN/1.73M2
GLUCOSE BLD-MCNC: 89 MG/DL (ref 70–99)
POTASSIUM SERPL-SCNC: 4.3 MMOL/L (ref 3.7–5.3)
SODIUM BLD-SCNC: 139 MMOL/L (ref 135–144)
TOTAL PROTEIN: 6.8 G/DL (ref 6.4–8.3)
TSH SERPL DL<=0.05 MIU/L-ACNC: 1.27 UIU/ML (ref 0.3–5)

## 2023-01-20 PROCEDURE — 83835 ASSAY OF METANEPHRINES: CPT

## 2023-01-20 PROCEDURE — 36415 COLL VENOUS BLD VENIPUNCTURE: CPT

## 2023-01-20 PROCEDURE — 83036 HEMOGLOBIN GLYCOSYLATED A1C: CPT

## 2023-01-20 PROCEDURE — 80061 LIPID PANEL: CPT

## 2023-01-20 PROCEDURE — G0296 VISIT TO DETERM LDCT ELIG: HCPCS | Performed by: FAMILY MEDICINE

## 2023-01-20 PROCEDURE — 84443 ASSAY THYROID STIM HORMONE: CPT

## 2023-01-20 PROCEDURE — 80053 COMPREHEN METABOLIC PANEL: CPT

## 2023-01-20 RX ORDER — DULOXETIN HYDROCHLORIDE 30 MG/1
30 CAPSULE, DELAYED RELEASE ORAL DAILY
Qty: 30 CAPSULE | Refills: 1 | Status: SHIPPED | OUTPATIENT
Start: 2023-01-20

## 2023-01-20 SDOH — ECONOMIC STABILITY: FOOD INSECURITY: WITHIN THE PAST 12 MONTHS, YOU WORRIED THAT YOUR FOOD WOULD RUN OUT BEFORE YOU GOT MONEY TO BUY MORE.: SOMETIMES TRUE

## 2023-01-20 SDOH — ECONOMIC STABILITY: FOOD INSECURITY: WITHIN THE PAST 12 MONTHS, THE FOOD YOU BOUGHT JUST DIDN'T LAST AND YOU DIDN'T HAVE MONEY TO GET MORE.: SOMETIMES TRUE

## 2023-01-20 ASSESSMENT — PATIENT HEALTH QUESTIONNAIRE - PHQ9
SUM OF ALL RESPONSES TO PHQ QUESTIONS 1-9: 8
SUM OF ALL RESPONSES TO PHQ9 QUESTIONS 1 & 2: 2
7. TROUBLE CONCENTRATING ON THINGS, SUCH AS READING THE NEWSPAPER OR WATCHING TELEVISION: 1
6. FEELING BAD ABOUT YOURSELF - OR THAT YOU ARE A FAILURE OR HAVE LET YOURSELF OR YOUR FAMILY DOWN: 1
SUM OF ALL RESPONSES TO PHQ QUESTIONS 1-9: 8
SUM OF ALL RESPONSES TO PHQ QUESTIONS 1-9: 8
1. LITTLE INTEREST OR PLEASURE IN DOING THINGS: 1
SUM OF ALL RESPONSES TO PHQ QUESTIONS 1-9: 8
3. TROUBLE FALLING OR STAYING ASLEEP: 1
2. FEELING DOWN, DEPRESSED OR HOPELESS: 1
10. IF YOU CHECKED OFF ANY PROBLEMS, HOW DIFFICULT HAVE THESE PROBLEMS MADE IT FOR YOU TO DO YOUR WORK, TAKE CARE OF THINGS AT HOME, OR GET ALONG WITH OTHER PEOPLE: 0
4. FEELING TIRED OR HAVING LITTLE ENERGY: 1
8. MOVING OR SPEAKING SO SLOWLY THAT OTHER PEOPLE COULD HAVE NOTICED. OR THE OPPOSITE, BEING SO FIGETY OR RESTLESS THAT YOU HAVE BEEN MOVING AROUND A LOT MORE THAN USUAL: 0
5. POOR APPETITE OR OVEREATING: 2
9. THOUGHTS THAT YOU WOULD BE BETTER OFF DEAD, OR OF HURTING YOURSELF: 0

## 2023-01-20 ASSESSMENT — SOCIAL DETERMINANTS OF HEALTH (SDOH): HOW HARD IS IT FOR YOU TO PAY FOR THE VERY BASICS LIKE FOOD, HOUSING, MEDICAL CARE, AND HEATING?: SOMEWHAT HARD

## 2023-01-20 NOTE — PROGRESS NOTES
Patient declines mammogram, pap, hepatitis c screen, HIV screen, shingles vaccine, flu vaccine, and covid vaccine    Patient declines seeing counseling or psychiatry for depression    Patient agreeable to CT lung screening.    Patient agreeable for  to reach out to discuss assistance resources

## 2023-01-20 NOTE — PROGRESS NOTES
ALEX DAVIS [address], [address]                        Telephone [phone]             Fax [phone]       Darek Mead  :  1965  Age:  65 y.o. MRN:  [id number]  Date of visit:  2023       Assessment and Plan:    1. Anxiety and depression  Psychiatry referral was recommended and declined. A referral for counseling was recommended and declined. She states that she has done well with Cymbalta previously. Cymbalta was ordered:  - DULoxetine (CYMBALTA) 30 MG extended release capsule; Take 1 capsule by mouth daily  Dispense: 30 capsule; Refill: 1    - TSH With Reflex Ft4; Future was ordered to be done today. She will be contacted when the results are available. I asked her to return in 3-4 weeks for re-evaluation. 2. Tobacco use  3. Personal history of tobacco use  Discussed with the patient the current USPSTF guidelines released 2021 for screening for lung cancer. For adults aged 48 to [de-identified] years who have a 20 pack-year smoking history and currently smoke or have quit within the past 15 years the grade B recommendation is to:  Screen for lung cancer with low-dose computed tomography (LDCT) every year. Stop screening once a person has not smoked for 15 years or has a health problem that limits life expectancy or the ability to have lung surgery. The patient  reports that she has been smoking cigarettes. She has a 25.00 pack-year smoking history. She does not have any smokeless tobacco history on file. . Discussed with patient the risks and benefits of screening, including over-diagnosis, false positive rate, and total radiation exposure. The patient currently exhibits no signs or symptoms suggestive of lung cancer.   Discussed with patient the importance of compliance with yearly annual lung cancer screenings and willingness to undergo diagnosis and treatment if screening scan is positive. In addition, the patient was counseled regarding the importance of remaining smoke free and/or total smoking cessation. Also reviewed the following if the patient has Medicare that as of February 10, 2022, Medicare only covers LDCT screening in patients aged 51-72 with at least a 20 pack-year smoking history who currently smoke or have quit in the last 15 years  - [place] VISIT TO DISCUSS LUNG CA SCREEN W LDCT  - CT Lung Screen (Annual/Baseline); Future    4. Adrenal nodule ([address]  I reviewed the results of the CT done during her hospitalization in November 2022. 24-hour urine for metanephrines and catecholamines was ordered:  - Metanephrines Urine; Future  - Miscellaneous Lab Test #1; Future    She will be contacted when the results are available. 5. Elevated glucose  Glucose was elevated during her hospitalization in November 2022. Labs were ordered to be done today:  - Comprehensive Metabolic Panel; Future  - Hemoglobin A1C; Future    She will be contacted when the results are available. 6. Tubular adenoma  She is due for a colonoscopy. A referral to general surgery was ordered:  - Ambulatory referral to General Surgery    7. Encounter for screening involving social determinants of health (SDoH)  - [address] [place] [address] Referral for Social Determinants of Health    8. Routine health maintenance  Health maintenance was reviewed with the patient. She has received three doses of the Pfizer Covid-19 vaccine. A bivalent Covid vaccine was recommended. Annual influenza vaccine was recommended. Shingrix was recommended. Lipid panel was recommended. Hepatitis C and HIV screenings were recommended. Follow up instructions were given to the patient:  Return in about 4 weeks (around 2/17/2023) for anxiety and depression.          Subjective:    Tania Sesay is a 65 y.o. female who presents to [place] [address] today (1/20/2023) for follow up/evaluation of:  New Patient (No previous PCP), Mass (Left buttock x 6 years. 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 She has not had a PCP in many years. She reports issues with anxiety and depression. 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 She is not taking any antidepressant medications currently. She states that Cymbalta has worked well for her in the past.  She has not had any screening tests done recently. She states that she is anxious about the potential results. She had labs and a CT done when she was hospitalized in November 2022 for a small bowel obstruction. She has received three doses of the Pfizer Covid-19 vaccine. The most recent dose was 6/23/2022. Immunization history was reviewed:  Immunization History   Administered Date(s) Administered    COVID-19, PFIZER GRAY top, DO NOT Dilute, (age 15 y+), IM, 30 mcg/0.3 mL 06/23/2022    COVID-19, PFIZER PURPLE top, DILUTE for use, (age 15 y+), 30mcg/0.3mL 04/21/2021, 08/20/2021    Influenza Virus Vaccine 10/18/2013    Pneumococcal Polysaccharide ([id number]) 01/29/2013    Tdap (Boostrix, Adacel) 01/29/2013          She has the following problem list:  Patient Active Problem List   Diagnosis    Small bowel obstruction ([facility]    Chronic obstructive pulmonary disease (HCC)    Gastroesophageal reflux disease    Anxiety disorder    Depression    Pain of both hip joints    Backache    Tobacco use        She does not take any prescription medications currently. She is allergic to latex, aleve [naproxen], naproxen sodium, sudafed [pseudoephedrine], and meloxicam.    She is a smoker. She  reports that she has been smoking cigarettes. She has a 25.00 pack-year smoking history. She does not have any smokeless tobacco history on file.       Objective:    Vitals:    01/20/23 1052   BP: 138/82   Site: Right Upper Arm   Position: Sitting   Cuff Size: Medium Adult Pulse: 100   SpO2: 98%   Weight: 121 lb (54.9 kg)   Height: 5' 4\" (1.626 m)     Body mass index is 20.77 kg/m². Well-nourished, well-developed female. She is alert and cooperative. She is anxious, but is otherwise in no acute distress. Neck supple. No adenopathy. Thyroid symmetric, normal size. Chest:  Normal expansion. Clear to auscultation. No rales, rhonchi, or wheezing. Heart sounds are normal.  Regular rate and rhythm without murmur, gallop or rub. Abdomen is soft, non-tender, non-distended. There are no masses palpated. Lower extremities have no edema. Results of labs done 11/8/2022 were reviewed with the patient:   No visits with results within 1 Month(s) from this visit.    Latest known visit with results is:   Admission on 11/18/2022, Discharged on 11/19/2022   Component Date Value Ref Range Status    WBC 11/18/2022 12.0 (A)  3.5 - 11.3 k/uL Final    RBC 11/18/2022 4.80  3.95 - 5.11 m/uL Final    Hemoglobin 11/18/2022 14.4  11.9 - 15.1 g/dL Final    Hematocrit 11/18/2022 42.7  36.3 - 47.1 % Final    MCV 11/18/2022 89.0  82.6 - 102.9 fL Final    MCH 11/18/2022 30.0  25.2 - 33.5 pg Final    MCHC 11/18/2022 33.7 (A)  25.2 - 33.5 g/dL Final    RDW 11/18/2022 13.3  11.8 - 14.4 % Final    Platelets 27/91/2409 392  138 - 453 k/uL Final    MPV 11/18/2022 10.4  8.1 - 13.5 fL Final    Seg Neutrophils 11/18/2022 84 (A)  36 - 65 % Final    Lymphocytes 11/18/2022 9 (A)  24 - 43 % Final    Monocytes 11/18/2022 6  3 - 12 % Final    Eosinophils % 11/18/2022 0 (A)  1 - 4 % Final    Basophils 11/18/2022 0  0 - 2 % Final    Immature Granulocytes 11/18/2022 0  0 % Final    Segs Absolute 11/18/2022 10.12 (A)  1.50 - 8.10 k/uL Final    Absolute Lymph # 11/18/2022 1.08 (A)  1.10 - 3.70 k/uL Final    Absolute Mono # 11/18/2022 0.76  0.10 - 1.20 k/uL Final    Absolute Eos # 11/18/2022 <0.03  0.00 - 0.44 k/uL Final    Basophils Absolute 11/18/2022 0.04  0.00 - 0.20 k/uL Final    Absolute Immature Granulocyte 11/18/2022 <0.03  0.00 - 0.30 k/uL Final    Albumin 11/18/2022 4.7  3.5 - 5.2 g/dL Final    Albumin/Globulin Ratio 11/18/2022 1.7  1.0 - 2.5 Final    Alkaline Phosphatase 11/18/2022 104  35 - 104 U/L Final    ALT 11/18/2022 9  5 - 33 U/L Final    AST 11/18/2022 17  <32 U/L Final    Total Bilirubin 11/18/2022 0.4  0.3 - 1.2 mg/dL Final    Bilirubin, Direct 11/18/2022 0.1  <0.3 mg/dL Final    Bilirubin, Indirect 11/18/2022 0.3  0.00 - 1.00 mg/dL Final    BUN 11/18/2022 13  6 - 20 mg/dL Final    Calcium 11/18/2022 10.6 (A)  8.6 - 10.4 mg/dL Final    Creatinine 11/18/2022 1.08 (A)  0.50 - 0.90 mg/dL Final    Est, Glom Filt Rate 11/18/2022 >60  >60 mL/min/1.73m2 Final    Comment:       Effective Oct 3, 2022        These results are not intended for use in patients <25years of age. eGFR results are calculated without a race factor using the 2021 CKD-EPI equation. Careful clinical correlation is recommended, particularly when comparing to results   calculated using previous equations. The CKD-EPI equation is less accurate in patients with extremes of muscle mass, extra-renal   metabolism of creatine, excessive creatine ingestion, or following therapy that affects   renal tubular secretion. Glucose 11/18/2022 154 (A)  70 - 99 mg/dL Final    Total Protein 11/18/2022 7.5  6.4 - 8.3 g/dL Final    Sodium 11/18/2022 140  135 - 144 mmol/L Final    Potassium 11/18/2022 3.8  3.7 - 5.3 mmol/L Final    Chloride 11/18/2022 98  98 - 107 mmol/L Final    CO2 11/18/2022 29  20 - 31 mmol/L Final    Anion Gap 11/18/2022 13  9 - 17 mmol/L Final    Lactic Acid 11/18/2022 1.8  0.5 - 2.2 mmol/L Final    Lipase 11/18/2022 17  13 - 60 U/L Final    Amylase 11/18/2022 39  28 - 100 U/L Final    Troponin, High Sensitivity 11/18/2022 9  0 - 14 ng/L Final    Comment:       High Sensitivity Troponin values cannot be compared with other Troponin methodologies.         Patients with high levels of Biotin oral intake (i.e >5mg/day) may have falsely decreased   Troponin levels. Samples collected within 8 hours of biotin intake may require additional   information for diagnosis. Ventricular Rate 11/18/2022 58  BPM Final    Atrial Rate 11/18/2022 58  BPM Final    P-R Interval 11/18/2022 128  ms Final    QRS Duration 11/18/2022 86  ms Final    Q-T Interval 11/18/2022 460  ms Final    QTc Calculation (Bazett) 11/18/2022 451  ms Final    P Axis 11/18/2022 38  degrees Final    R Axis 11/18/2022 75  degrees Final    T Axis 11/18/2022 66  degrees Final    Specimen Description 11/18/2022 . NASOPHARYNGEAL SWAB   Final    SARS-CoV-2, Rapid 11/18/2022 Not Detected  Not Detected Final    Comment:       Rapid NAAT:  The specimen is NEGATIVE for SARS-CoV-2, the novel coronavirus associated with   COVID-19. The ID NOW COVID-19 assay is designed to detect the virus that causes COVID-19 in patients   with signs and symptoms of infection who are suspected of COVID-19. An individual without symptoms of COVID-19 and who is not shedding SARS-CoV-2 virus would   expect to have a negative (not detected) result in this assay. Negative results should be treated as presumptive and, if inconsistent with clinical signs   and symptoms or necessary for patient management,  should be tested with an alternative molecular assay. Negative results do not preclude   SARS-CoV-2 infection and   should not be used as the sole basis for patient management decisions. Fact sheet for Healthcare Providers: http://www.yesy.jacek/  Fact sheet for Patients: http://www.stanley-honorio.jacek/        Methodology: Isothermal Nucleic Acid Amplification      Flu A Antigen 11/18/2022 NEGATIVE  NEGATIVE Final    for Influenza A Antigen    Flu B Antigen 11/18/2022 NEGATIVE  NEGATIVE Final    for Influenza B Antigen.     Glucose, Ur 11/18/2022 NEGATIVE  NEGATIVE Final    Bilirubin Urine 11/18/2022 NEGATIVE  NEGATIVE Final    Ketones, Urine 11/18/2022 NEGATIVE NEGATIVE Final    Specific Gravity, UA 11/18/2022 1.010  1.010 - 1.025 Final    Urine Hgb 11/18/2022 NEGATIVE  NEGATIVE Final    pH, UA 11/18/2022 7.5 (A)  5.0 - 6.0 Final    Protein, UA 11/18/2022 NEGATIVE  NEGATIVE Final    Urobilinogen, Urine 11/18/2022 Normal  Normal Final    Nitrite, Urine 11/18/2022 NEGATIVE  NEGATIVE Final    Leukocyte Esterase, Urine 11/18/2022 NEGATIVE  NEGATIVE Final    WBC, UA 11/18/2022 0 TO 4  0 - 4 /HPF Final    RBC, UA 11/18/2022 None  0 - 4 /HPF Final    Epithelial Cells UA 11/18/2022 0 TO 4  0 - 5 /HPF Final    Bacteria, UA 11/18/2022 1+ (A)  None Final    WBC 11/19/2022 8.9  3.5 - 11.3 k/uL Final    RBC 11/19/2022 4.24  3.95 - 5.11 m/uL Final    Hemoglobin 11/19/2022 12.8  11.9 - 15.1 g/dL Final    Hematocrit 11/19/2022 38.8  36.3 - 47.1 % Final    MCV 11/19/2022 91.5  82.6 - 102.9 fL Final    MCH 11/19/2022 30.2  25.2 - 33.5 pg Final    MCHC 11/19/2022 33.0  25.2 - 33.5 g/dL Final    RDW 11/19/2022 13.7  11.8 - 14.4 % Final    Platelets 43/30/6498 321  138 - 453 k/uL Final    MPV 11/19/2022 11.0  8.1 - 13.5 fL Final    Seg Neutrophils 11/19/2022 73 (A)  36 - 65 % Final    Lymphocytes 11/19/2022 19 (A)  24 - 43 % Final    Monocytes 11/19/2022 7  3 - 12 % Final    Eosinophils % 11/19/2022 1  1 - 4 % Final    Basophils 11/19/2022 1  0 - 2 % Final    Immature Granulocytes 11/19/2022 0  0 % Final    Segs Absolute 11/19/2022 6.47  1.50 - 8.10 k/uL Final    Absolute Lymph # 11/19/2022 1.68  1.10 - 3.70 k/uL Final    Absolute Mono # 11/19/2022 0.64  0.10 - 1.20 k/uL Final    Absolute Eos # 11/19/2022 0.06  0.00 - 0.44 k/uL Final    Basophils Absolute 11/19/2022 0.05  0.00 - 0.20 k/uL Final    Absolute Immature Granulocyte 11/19/2022 <0.03  0.00 - 0.30 k/uL Final    Glucose 11/19/2022 92  70 - 99 mg/dL Final    BUN 11/19/2022 9  6 - 20 mg/dL Final    Creatinine 11/19/2022 0.82  0.50 - 0.90 mg/dL Final    Est, Glom Filt Rate 11/19/2022 >60  >60 mL/min/1.73m2 Final    Comment: Effective Oct 3, 2022        These results are not intended for use in patients <25years of age. eGFR results are calculated without a race factor using the 2021 CKD-EPI equation. Careful clinical correlation is recommended, particularly when comparing to results   calculated using previous equations. The CKD-EPI equation is less accurate in patients with extremes of muscle mass, extra-renal   metabolism of creatine, excessive creatine ingestion, or following therapy that affects   renal tubular secretion. Bun/Cre Ratio 11/19/2022 11  9 - 20 Final    Calcium 11/19/2022 9.1  8.6 - 10.4 mg/dL Final    Sodium 11/19/2022 140  135 - 144 mmol/L Final    Potassium 11/19/2022 3.6 (A)  3.7 - 5.3 mmol/L Final    Chloride 11/19/2022 102  98 - 107 mmol/L Final    CO2 11/19/2022 28  20 - 31 mmol/L Final    Anion Gap 11/19/2022 10  9 - 17 mmol/L Final     I reviewed the results of the CT scan done 11/18/2022:  Impression   1. Small-bowel obstruction involving the jejunum. The transition point is   not clearly identified. 2.  Left adrenal nodule with imaging characteristics probably representing an   adenoma but cannot exclude a pheochromocytoma. Recommend biochemical lab   evaluation for pheochromocytoma. If lab values are normal 1 year follow-up   adrenal washout CT is recommended.                 (Please note that portions of this note were completed with a voice-recognition program. Efforts were made to edit the dictation but occasionally words are mis-transcribed.)

## 2023-01-20 NOTE — PATIENT INSTRUCTIONS

## 2023-01-21 LAB
CHOLESTEROL/HDL RATIO: 3.3
CHOLESTEROL: 202 MG/DL
HDLC SERPL-MCNC: 61 MG/DL
LDL CHOLESTEROL: 118 MG/DL (ref 0–130)
TRIGL SERPL-MCNC: 117 MG/DL

## 2023-01-22 ENCOUNTER — HOSPITAL ENCOUNTER (OUTPATIENT)
Age: 58
Setting detail: SPECIMEN
Discharge: HOME | End: 2023-01-22
Payer: COMMERCIAL

## 2023-01-22 LAB
ESTIMATED AVERAGE GLUCOSE: 114 MG/DL
HBA1C MFR BLD: 5.6 % (ref 4–6)

## 2023-01-22 PROCEDURE — 82384 ASSAY THREE CATECHOLAMINES: CPT

## 2023-01-23 ENCOUNTER — TELEPHONE (OUTPATIENT)
Dept: ONCOLOGY | Age: 58
End: 2023-01-23

## 2023-01-23 DIAGNOSIS — E27.8 ADRENAL NODULE (HCC): ICD-10-CM

## 2023-01-23 NOTE — LETTER
1/23/2023        Chemo Florentino  [address] [facility]    Dear Chemo Florentino:    Your healthcare provider has ordered a low dose CT scan of the chest for lung cancer screening. Enclosed you will find information about CT lung screening and smoking cessation resources. If you are unable to keep you appointment for you CT lung screening, please call our scheduling department at [phone]. Keep in mind that CT lung screening does not take the place of smoking cessation. Please do not hesitate to contact me if you have any questions or concerns.     [address] [facility],      [facility]  [phone]

## 2023-01-26 ENCOUNTER — TELEPHONE (OUTPATIENT)
Dept: FAMILY MEDICINE CLINIC | Age: 58
End: 2023-01-26

## 2023-01-26 ENCOUNTER — INITIAL CONSULT (OUTPATIENT)
Dept: SURGERY | Age: 58
End: 2023-01-26
Payer: COMMERCIAL

## 2023-01-26 VITALS
DIASTOLIC BLOOD PRESSURE: 88 MMHG | HEART RATE: 94 BPM | SYSTOLIC BLOOD PRESSURE: 138 MMHG | WEIGHT: 121 LBS | HEIGHT: 64 IN | BODY MASS INDEX: 20.66 KG/M2

## 2023-01-26 DIAGNOSIS — Z87.19 HX OF SMALL BOWEL OBSTRUCTION: ICD-10-CM

## 2023-01-26 DIAGNOSIS — E27.8 ADRENAL NODULE (HCC): Primary | ICD-10-CM

## 2023-01-26 DIAGNOSIS — K59.00 CONSTIPATION, UNSPECIFIED CONSTIPATION TYPE: Primary | ICD-10-CM

## 2023-01-26 PROCEDURE — 99215 OFFICE O/P EST HI 40 MIN: CPT | Performed by: SURGERY

## 2023-01-26 RX ORDER — GABAPENTIN 800 MG/1
800 TABLET ORAL DAILY
COMMUNITY

## 2023-01-26 NOTE — PROGRESS NOTES
Edwardo Koroma is a 65 y.o. female who presents today for follow-up after hospitalization in late November as well as to discuss constipation and schedule colonoscopy. Patient was admitted to the hospital with abdominal pain and had a CT that showed possible small bowel obstruction with a narrowing of the jejunum though there was not really a transition point. She was managed conservatively and very quickly had resolution of her symptoms over less than 24 hours. She has had 1 prior episode several years ago that was similar and also resolved with conservative management. No prior reported abdominal surgical history. She was recently seen back by her PCP and was reporting that she does have a longstanding history of constipation that typically she treats with over-the-counter laxatives and this has been going on for years. Has not had any recent changes in bowel movements. It was noted that she was due for colonoscopy as her last one was just at 5 years ago with polyps removed. She was referred to general surgery for consideration of repeat colonoscopy for screening as well as to further evaluate her constipation. Denies any blood per rectum. States that she will go a day or 2 between bowel movements sometimes and occasionally will use either stool softeners or over-the-counter laxatives to help with bowel movements. No blood per rectum. No unintended weight loss.     Past Medical History:   Diagnosis Date    Anxiety     COPD (chronic obstructive pulmonary disease) ([address]     Depression     Small bowel obstruction ([address]        Past Surgical History:   Procedure Laterality Date     SECTION       SECTION      COLONOSCOPY  2018    one rectal polyp, removed piecemeal, pathology:  tubular adenoma, hemorrhoid, tortuous colon, done at [facility]    ESOPHAGOGASTRODUODENOSCOPY  2018    one pyloric polyp, benign, otherwise unremarkable, done at [facility]    ESOPHAGOGASTRODUODENOSCOPY  03/01/2016    biopsy: chemical gastritis with focal erosive changes, [facility]    PARTIAL HYSTERECTOMY (CERVIX NOT REMOVED) N/A 2000    ovaries not removed, unsure of year- patient reports around year 2000       Current Outpatient Medications   Medication Sig Dispense Refill    gabapentin (NEURONTIN) 800 MG tablet Take 800 mg by mouth daily. DULoxetine (CYMBALTA) 30 MG extended release capsule Take 1 capsule by mouth daily 30 capsule 1     No current facility-administered medications for this visit.        Allergies   Allergen Reactions    Latex Itching    Aleve [Naproxen] Shortness Of Breath    Naproxen Sodium Itching    Sudafed [Pseudoephedrine] Shortness Of Breath    Meloxicam Itching       Family History   Problem Relation Age of Onset    Lung Cancer Father     Colon Cancer Paternal Grandfather        Social History     Socioeconomic History    Marital status:      Spouse name: Not on file    Number of children: Not on file    Years of education: Not on file    Highest education level: Not on file   Occupational History    Not on file   Tobacco Use    Smoking status: Every Day     Packs/day: 1.00     Years: 25.00     Pack years: 25.00     Types: Cigarettes    Smokeless tobacco: Not on file   Vaping Use    Vaping Use: Never used   Substance and Sexual Activity    Alcohol use: Not Currently    Drug use: Yes     Types: Marijuana Lucianne Bars)    Sexual activity: Not Currently   Other Topics Concern    Not on file   Social History Narrative    Not on file     Social Determinants of Health     Financial Resource Strain: Medium Risk    Difficulty of Paying Living Expenses: Somewhat hard   Food Insecurity: Food Insecurity Present    Worried About Running Out of Food in the Last Year: Sometimes true    Ran Out of Food in the Last Year: Sometimes true   Transportation Needs: Not on file   Physical Activity: Not on file   Stress: Not on file   Social Connections: Not on file   Intimate Partner Violence: Not on file   Housing Stability: Not on file       ROS:   Review of Systems - General ROS: negative  Psychological ROS: negative  Ophthalmic ROS: negative  ENT ROS: negative  Respiratory ROS: no cough, shortness of breath, or wheezing  Cardiovascular ROS: no chest pain or dyspnea on exertion  Gastrointestinal ROS: per HPI  Genito-Urinary ROS: no dysuria, trouble voiding, or hematuria  Musculoskeletal ROS: negative  Dermatological ROS: negative      Objective   Vitals:    01/26/23 1257   BP: 138/88   Pulse: 94     General:in no apparent distress and well developed and well nourished  Eyes: No gross abnormalities. Ears, Nose, Throat: hearing grossly normal bilaterally  Neck: neck supple and non tender without mass  Lungs: clear to auscultation without wheezes or rales   Heart: S1S2, no mumurs, RRR  Abdomen: Soft, nondistended, nontender, bowel sounds present  Extremity: negative  Neuro: CN II-XII grossly intact      1. Constipation, unspecified constipation type  Assessment & Plan:  The patient is not currently having any symptoms to suggest any sort of ongoing bowel obstruction. She does have a longstanding history of constipation and is really not had any significant work-up for this. Get a go ahead and get her plan for colonoscopy as she is due for screening and this will also allow us to further evaluate the colon with biopsies. Risks of the procedure including bleeding, infection, perforation, need for the surgery and anesthesia risk are discussed and consent is obtained.   2. Hx of small bowel obstruction         (Please note that portions of this note were completed with a voice recognition program.  Efforts were made to edit the dictations but occasionally words are mis-transcribed.)

## 2023-01-26 NOTE — TELEPHONE ENCOUNTER
----- Message from Tawnya Garcia MD sent at 1/24/2023  4:08 PM EST -----  Please advise Christian Celi that the lab results have no significant abnormalities. She should have a repeat CT of the abdomen and pelvis in November to re-evaluate the nodule on the left adrenal gland. Please order. Thank you.

## 2023-01-26 NOTE — ASSESSMENT & PLAN NOTE
The patient is not currently having any symptoms to suggest any sort of ongoing bowel obstruction. She does have a longstanding history of constipation and is really not had any significant work-up for this. Get a go ahead and get her plan for colonoscopy as she is due for screening and this will also allow us to further evaluate the colon with biopsies. Risks of the procedure including bleeding, infection, perforation, need for the surgery and anesthesia risk are discussed and consent is obtained.

## 2023-01-27 LAB
CATECHOL/URINE INTERP: NORMAL
CREATININE URINE /24 HR: 759 MG/D (ref 500–1400)
CREATININE URINE /VOLUME: 69 MG/DL
DOPAMINE 24 HOUR URINE: 106 UG/D (ref 71–485)
DOPAMINE, (UG/L): 96 UG/L
DOPAMINE, UR/G CRT: 139 UG/G CRT (ref 0–250)
EPINEPHRINE 24 HOUR URINE: 6 UG/D (ref 1–14)
EPINEPHRINE, (UG/L): 5 UG/L
EPINEPHRINE, UR/G CRT: 7 UG/G CRT (ref 0–20)
HOURS COLLECTED: 24
NOREPINEPHRINE 24 HOUR URINE: 33 UG/D (ref 14–120)
NOREPINEPHRINE URINE MCG/GCR: 43 UG/G CRT (ref 0–45)
NOREPINEPHRINE, (UG/L): 30 UG/L
URINE VOLUME: 1100

## 2023-02-02 ENCOUNTER — HOSPITAL ENCOUNTER (OUTPATIENT)
Dept: CT IMAGING | Age: 58
Discharge: HOME | End: 2023-02-04
Payer: MEDICARE

## 2023-02-02 DIAGNOSIS — Z87.891 PERSONAL HISTORY OF TOBACCO USE: ICD-10-CM

## 2023-02-02 PROCEDURE — 71271 CT THORAX LUNG CANCER SCR C-: CPT

## 2023-02-08 ENCOUNTER — TELEPHONE (OUTPATIENT)
Dept: FAMILY MEDICINE CLINIC | Age: 58
End: 2023-02-08

## 2023-02-08 DIAGNOSIS — R91.1 LUNG NODULE: Primary | ICD-10-CM

## 2023-02-08 NOTE — DISCHARGE INSTRUCTIONS
Discharge Instructions for Colonoscopy     Colonoscopy is a visual exam of the lining of the large intestine, also called the bowel or colon, with a colonoscope. A colonoscope is a flexible tube with a light and a viewing device. It allows the doctor to view the inside of the colon through a tiny video camera. Colonoscopy is performed for many reasons: unexplained anemia , pain, diarrhea , bloody stools, cancer screening, among many other reasons. Complications from a colonoscopy are rare. Some possible serious complications include perforated bowel (which might require surgery) and bleeding (which could require blood transfusion ). Minor complications include bloating, gas, and cramping that can last for 1-2 days after the procedure. Because air is put into your colon during the procedure, it is normal to pass large amounts of air from your rectum. You may not have a bowel movement for 1-3 days after the procedure. What You Will Need   Someone to drive you home after the procedure    Steps to [facility] when you get home. Because the sedative will make you drowsy, don't drive, operate machinery, or make important decisions the day of the procedure. Feelings of bloating, gas, or cramping may persist for 24 hours. Diet    Try sips of water first. If tolerated, resume regular diet or the diet recommended by your physician. Do not drink alcohol for 24 hours. Physical Activity    Ask your doctor when you will be able to return to work. Do not drive, operate heavy machinery, or do activities that require coordination or balance for 24 hours. Otherwise, return to your normal routine as soon as you are comfortable to do so, which is usually the next day after the procedure. Medications    When taking medications, it's important to: Take your medication as directednot more, not less, not at a different time.    Do not stop taking them without consulting your healthcare provider. Don't share them with anyone else. Know what effects and side effects to expect, and report them to your healthcare provider. If you are taking more than one drug, even if it is an over-the-counter medication, herb, or dietary supplement, be sure to check with a physician or pharmacist about drug interactions. Plan ahead for refills so you don't run out. Lifestyle Changes    The results of your colonoscopy will determine if any lifestyle changes are necessary. Follow-up   The doctor will usually give you a preliminary report after the medication wears off and you are more alert. The results from a biopsy can take as long as 1-2 weeks to be completed. Schedule a follow-up appointment as directed by your doctor. You should schedule a follow-up colonoscopy as recommended by your doctor. Call Your Doctor If Any of the Following Occurs   Monitor your recovery once you leave the hospital. As soon as you have a problem, alert your doctor. If any of the following occur, call your doctor:   Bleeding from your rectum; notify your doctor if you pass a teaspoonful or more of blood   Black, tarry stools   Severe abdominal pain   Hard, swollen abdomen   Signs of infection, including fever or chills   Inability to pass gas or stool   Coughing, shortness of breath, chest pain, severe nausea or vomiting   In case of an emergency, call 911 immediately.

## 2023-02-08 NOTE — H&P
Stacey Trujillo is a 65 y.o. female who presents today for follow-up after hospitalization in late November as well as to discuss constipation and schedule colonoscopy. Patient was admitted to the hospital with abdominal pain and had a CT that showed possible small bowel obstruction with a narrowing of the jejunum though there was not really a transition point. She was managed conservatively and very quickly had resolution of her symptoms over less than 24 hours. She has had 1 prior episode several years ago that was similar and also resolved with conservative management. No prior reported abdominal surgical history. She was recently seen back by her PCP and was reporting that she does have a longstanding history of constipation that typically she treats with over-the-counter laxatives and this has been going on for years. Has not had any recent changes in bowel movements. It was noted that she was due for colonoscopy as her last one was just at 5 years ago with polyps removed. She was referred to general surgery for consideration of repeat colonoscopy for screening as well as to further evaluate her constipation. Denies any blood per rectum. States that she will go a day or 2 between bowel movements sometimes and occasionally will use either stool softeners or over-the-counter laxatives to help with bowel movements. No blood per rectum. No unintended weight loss.      Past Medical History        Past Medical History:   Diagnosis Date    Anxiety      COPD (chronic obstructive pulmonary disease) ([address]      Depression      Small bowel obstruction ([address]              Past Surgical History         Past Surgical History:   Procedure Laterality Date     SECTION         SECTION        COLONOSCOPY   2018     one rectal polyp, removed piecemeal, pathology:  tubular adenoma, hemorrhoid, tortuous colon, done at [facility]    ESOPHAGOGASTRODUODENOSCOPY   2018 one pyloric polyp, benign, otherwise unremarkable, done at [facility]    ESOPHAGOGASTRODUODENOSCOPY   03/01/2016     biopsy: chemical gastritis with focal erosive changes, [facility]    PARTIAL HYSTERECTOMY (CERVIX NOT REMOVED) N/A 2000     ovaries not removed, unsure of year- patient reports around year 2000            Current Facility-Administered Medications          Current Outpatient Medications   Medication Sig Dispense Refill    gabapentin (NEURONTIN) 800 MG tablet Take 800 mg by mouth daily. DULoxetine (CYMBALTA) 30 MG extended release capsule Take 1 capsule by mouth daily 30 capsule 1      No current facility-administered medications for this visit.                  Allergies   Allergen Reactions    Latex Itching    Aleve [Naproxen] Shortness Of Breath    Naproxen Sodium Itching    Sudafed [Pseudoephedrine] Shortness Of Breath    Meloxicam Itching         Family History         Family History   Problem Relation Age of Onset    Lung Cancer Father      Colon Cancer Paternal Grandfather              Social History               Socioeconomic History    Marital status:        Spouse name: Not on file    Number of children: Not on file    Years of education: Not on file    Highest education level: Not on file   Occupational History    Not on file   Tobacco Use    Smoking status: Every Day       Packs/day: 1.00       Years: 25.00       Pack years: 25.00       Types: Cigarettes    Smokeless tobacco: Not on file   Vaping Use    Vaping Use: Never used   Substance and Sexual Activity    Alcohol use: Not Currently    Drug use: Yes       Types: Marijuana Delpha Reaper)    Sexual activity: Not Currently   Other Topics Concern    Not on file   Social History Narrative    Not on file      Social Determinants of Health          Financial Resource Strain: Medium Risk    Difficulty of Paying Living Expenses: Somewhat hard   Food Insecurity: Food Insecurity Present    Worried About Running Out of Food in the Last Year: Sometimes true    Ran Out of Food in the Last Year: Sometimes true   Transportation Needs: Not on file   Physical Activity: Not on file   Stress: Not on file   Social Connections: Not on file   Intimate Partner Violence: Not on file   Housing Stability: Not on file            ROS:   Review of Systems - General ROS: negative  Psychological ROS: negative  Ophthalmic ROS: negative  ENT ROS: negative  Respiratory ROS: no cough, shortness of breath, or wheezing  Cardiovascular ROS: no chest pain or dyspnea on exertion  Gastrointestinal ROS: per HPI  Genito-Urinary ROS: no dysuria, trouble voiding, or hematuria  Musculoskeletal ROS: negative  Dermatological ROS: negative        Objective       Vitals:     01/26/23 1257   BP: 138/88   Pulse: 94      General:in no apparent distress and well developed and well nourished  Eyes: No gross abnormalities. Ears, Nose, Throat: hearing grossly normal bilaterally  Neck: neck supple and non tender without mass  Lungs: clear to auscultation without wheezes or rales   Heart: S1S2, no mumurs, RRR  Abdomen: Soft, nondistended, nontender, bowel sounds present  Extremity: negative  Neuro: CN II-XII grossly intact        1. Constipation, unspecified constipation type  Assessment & Plan:  The patient is not currently having any symptoms to suggest any sort of ongoing bowel obstruction. She does have a longstanding history of constipation and is really not had any significant work-up for this. Get a go ahead and get her plan for colonoscopy as she is due for screening and this will also allow us to further evaluate the colon with biopsies. Risks of the procedure including bleeding, infection, perforation, need for the surgery and anesthesia risk are discussed and consent is obtained.   2. Hx of small bowel obstruction           (Please note that portions of this note were completed with a voice recognition program.  Efforts were made to edit the dictations but occasionally words are mis-transcribed.)    The patient was interviewed and examined and there have been no changes since the above History and Physical.    Electronically signed by Nikkie Jorgensen DO on 2/8/2023 at 5:23 PM

## 2023-02-08 NOTE — TELEPHONE ENCOUNTER
Patient notified and verbalized understanding.  Patient is agreeable to 6 month follow up    LDCT follow up pended

## 2023-02-08 NOTE — TELEPHONE ENCOUNTER
----- Message from Savana Santana MD sent at 2/3/2023  1:15 PM EST -----  Please advise Jax Titus that the lung CT showed one nodule in each lung. The radiologist's opinion is that these are not cancer, but the radiologist has recommended another CT in 6 months to see if there is any change.

## 2023-02-09 ENCOUNTER — ANESTHESIA (OUTPATIENT)
Dept: OPERATING ROOM | Age: 58
End: 2023-02-09
Payer: MEDICARE

## 2023-02-09 ENCOUNTER — ANESTHESIA EVENT (OUTPATIENT)
Dept: OPERATING ROOM | Age: 58
End: 2023-02-09
Payer: MEDICARE

## 2023-02-09 ENCOUNTER — HOSPITAL ENCOUNTER (OUTPATIENT)
Age: 58
Setting detail: OUTPATIENT SURGERY
Discharge: HOME | End: 2023-02-09
Attending: SURGERY | Admitting: SURGERY
Payer: MEDICARE

## 2023-02-09 VITALS
TEMPERATURE: 98.7 F | HEART RATE: 72 BPM | RESPIRATION RATE: 16 BRPM | HEIGHT: 64 IN | OXYGEN SATURATION: 95 % | DIASTOLIC BLOOD PRESSURE: 66 MMHG | WEIGHT: 119.2 LBS | BODY MASS INDEX: 20.35 KG/M2 | SYSTOLIC BLOOD PRESSURE: 118 MMHG

## 2023-02-09 DIAGNOSIS — R19.8 ALTERNATING CONSTIPATION AND DIARRHEA: ICD-10-CM

## 2023-02-09 PROCEDURE — 2580000003 HC RX 258: Performed by: SURGERY

## 2023-02-09 PROCEDURE — 45385 COLONOSCOPY W/LESION REMOVAL: CPT | Performed by: SURGERY

## 2023-02-09 PROCEDURE — 7100000010 HC PHASE II RECOVERY - FIRST 15 MIN: Performed by: SURGERY

## 2023-02-09 PROCEDURE — 3609010400 HC COLONOSCOPY POLYPECTOMY HOT BIOPSY: Performed by: SURGERY

## 2023-02-09 PROCEDURE — 2500000003 HC RX 250 WO HCPCS: Performed by: NURSE ANESTHETIST, CERTIFIED REGISTERED

## 2023-02-09 PROCEDURE — 6360000002 HC RX W HCPCS: Performed by: NURSE ANESTHETIST, CERTIFIED REGISTERED

## 2023-02-09 PROCEDURE — 3700000001 HC ADD 15 MINUTES (ANESTHESIA): Performed by: SURGERY

## 2023-02-09 PROCEDURE — 2709999900 HC NON-CHARGEABLE SUPPLY: Performed by: SURGERY

## 2023-02-09 PROCEDURE — 3700000000 HC ANESTHESIA ATTENDED CARE: Performed by: SURGERY

## 2023-02-09 PROCEDURE — 88305 TISSUE EXAM BY PATHOLOGIST: CPT

## 2023-02-09 PROCEDURE — 7100000011 HC PHASE II RECOVERY - ADDTL 15 MIN: Performed by: SURGERY

## 2023-02-09 PROCEDURE — 45384 COLONOSCOPY W/LESION REMOVAL: CPT | Performed by: SURGERY

## 2023-02-09 RX ORDER — SODIUM CHLORIDE 0.9 % (FLUSH) 0.9 %
5-40 SYRINGE (ML) INJECTION EVERY 12 HOURS SCHEDULED
Status: DISCONTINUED | OUTPATIENT
Start: 2023-02-09 | End: 2023-02-09 | Stop reason: HOSPADM

## 2023-02-09 RX ORDER — PROPOFOL 10 MG/ML
INJECTION, EMULSION INTRAVENOUS CONTINUOUS PRN
Status: DISCONTINUED | OUTPATIENT
Start: 2023-02-09 | End: 2023-02-09 | Stop reason: SDUPTHER

## 2023-02-09 RX ORDER — SODIUM CHLORIDE 0.9 % (FLUSH) 0.9 %
5-40 SYRINGE (ML) INJECTION PRN
Status: DISCONTINUED | OUTPATIENT
Start: 2023-02-09 | End: 2023-02-09 | Stop reason: HOSPADM

## 2023-02-09 RX ORDER — FENTANYL CITRATE 50 UG/ML
INJECTION, SOLUTION INTRAMUSCULAR; INTRAVENOUS PRN
Status: DISCONTINUED | OUTPATIENT
Start: 2023-02-09 | End: 2023-02-09 | Stop reason: SDUPTHER

## 2023-02-09 RX ORDER — SODIUM CHLORIDE 9 MG/ML
INJECTION, SOLUTION INTRAVENOUS PRN
Status: DISCONTINUED | OUTPATIENT
Start: 2023-02-09 | End: 2023-02-09 | Stop reason: HOSPADM

## 2023-02-09 RX ORDER — MIDAZOLAM HYDROCHLORIDE 1 MG/ML
INJECTION INTRAMUSCULAR; INTRAVENOUS PRN
Status: DISCONTINUED | OUTPATIENT
Start: 2023-02-09 | End: 2023-02-09 | Stop reason: SDUPTHER

## 2023-02-09 RX ORDER — SODIUM CHLORIDE, SODIUM LACTATE, POTASSIUM CHLORIDE, CALCIUM CHLORIDE 600; 310; 30; 20 MG/100ML; MG/100ML; MG/100ML; MG/100ML
INJECTION, SOLUTION INTRAVENOUS CONTINUOUS
Status: DISCONTINUED | OUTPATIENT
Start: 2023-02-09 | End: 2023-02-09 | Stop reason: HOSPADM

## 2023-02-09 RX ADMIN — PROPOFOL 180 MCG/KG/MIN: 10 INJECTION, EMULSION INTRAVENOUS at 13:04

## 2023-02-09 RX ADMIN — GLUCAGON HYDROCHLORIDE 1 MG: KIT at 13:10

## 2023-02-09 RX ADMIN — GLUCAGON HYDROCHLORIDE 1 MG: KIT at 13:30

## 2023-02-09 RX ADMIN — MIDAZOLAM HYDROCHLORIDE 2 MG: 1 INJECTION, SOLUTION INTRAMUSCULAR; INTRAVENOUS at 13:04

## 2023-02-09 RX ADMIN — FENTANYL CITRATE 50 MCG: 50 INJECTION, SOLUTION INTRAMUSCULAR; INTRAVENOUS at 13:04

## 2023-02-09 RX ADMIN — FENTANYL CITRATE 25 MCG: 50 INJECTION, SOLUTION INTRAMUSCULAR; INTRAVENOUS at 13:08

## 2023-02-09 RX ADMIN — FENTANYL CITRATE 25 MCG: 50 INJECTION, SOLUTION INTRAMUSCULAR; INTRAVENOUS at 13:10

## 2023-02-09 RX ADMIN — SODIUM CHLORIDE, POTASSIUM CHLORIDE, SODIUM LACTATE AND CALCIUM CHLORIDE: 600; 310; 30; 20 INJECTION, SOLUTION INTRAVENOUS at 12:07

## 2023-02-09 ASSESSMENT — PAIN - FUNCTIONAL ASSESSMENT: PAIN_FUNCTIONAL_ASSESSMENT: 0-10

## 2023-02-09 ASSESSMENT — PAIN SCALES - GENERAL
PAINLEVEL_OUTOF10: 0
PAINLEVEL_OUTOF10: 0

## 2023-02-09 ASSESSMENT — COPD QUESTIONNAIRES: CAT_SEVERITY: NO INTERVAL CHANGE

## 2023-02-09 NOTE — ANESTHESIA PRE PROCEDURE
Department of Anesthesiology  Preprocedure Note       Name:  Jareth Benavides   Age:  65 y.o.  :  1965                                          MRN:  [id number]         Date:  2023      Surgeon: Deion Barrientos):  Mley Allan DO    Procedure: Procedure(s):  Colonoscopy    Medications prior to admission:   Prior to Admission medications    Medication Sig Start Date End Date Taking? Authorizing Provider   gabapentin (NEURONTIN) 800 MG tablet Take 800 mg by mouth daily. Historical Provider, MD   DULoxetine (CYMBALTA) 30 MG extended release capsule Take 1 capsule by mouth daily 23   An Manuel MD       Current medications:    Current Facility-Administered Medications   Medication Dose Route Frequency Provider Last Rate Last Admin    lactated ringers IV soln infusion   IntraVENous Continuous Mely Allan DO        sodium chloride flush 0.9 % injection 5-40 mL  5-40 mL IntraVENous 2 times per day Mely Allan DO        sodium chloride flush 0.9 % injection 5-40 mL  5-40 mL IntraVENous PRN Mely Allan,         0.9 % sodium chloride infusion   IntraVENous PRN Mely Allan DO           Allergies: Allergies   Allergen Reactions    Latex Itching    Aleve [Naproxen] Shortness Of Breath    Naproxen Sodium Itching    Sudafed [Pseudoephedrine] Shortness Of Breath    Meloxicam Itching       Problem List:    Patient Active Problem List   Diagnosis Code    Small bowel obstruction (HCC) K56.609    Chronic obstructive pulmonary disease (HCC) J44.9    Gastroesophageal reflux disease K21.9    Anxiety disorder F41.9    Depression F32. A    Pain of both hip joints M25.551, M25.552    Backache M54.9    Tobacco use Z72.0    Constipation K59.00       Past Medical History:        Diagnosis Date    Anxiety     COPD (chronic obstructive pulmonary disease) (HCC)     Depression     Small bowel obstruction (HCC)        Past Surgical History:        Procedure Laterality Date     SECTION       SECTION      COLONOSCOPY  2018    one rectal polyp, removed piecemeal, pathology:  tubular adenoma, hemorrhoid, tortuous colon, done at [place] [address] ESOPHAGOGASTRODUODENOSCOPY  2018    one pyloric polyp, benign, otherwise unremarkable, done at [facility] ESOPHAGOGASTRODUODENOSCOPY  2016    biopsy: chemical gastritis with focal erosive changes, [facility]    PARTIAL HYSTERECTOMY (CERVIX NOT REMOVED) N/A     ovaries not removed, unsure of year- patient reports around 2000       Social History:    Social History     Tobacco Use    Smoking status: Every Day     Packs/day: 1.00     Years: 25.00     Pack years: 25.00     Types: Cigarettes    Smokeless tobacco: Not on file   Substance Use Topics    Alcohol use: Not Currently                                Ready to quit: Not Answered  Counseling given: Not Answered      Vital Signs (Current): There were no vitals filed for this visit.                                            BP Readings from Last 3 Encounters:   23 138/88   23 138/82   22 (!) 162/83       NPO Status:                                                   Date of last liquid consumption: 23                        Date of last solid food consumption: 23    BMI:   Wt Readings from Last 3 Encounters:   23 121 lb (54.9 kg)   23 121 lb (54.9 kg)   22 125 lb (56.7 kg)     There is no height or weight on file to calculate BMI.    CBC:   Lab Results   Component Value Date/Time    WBC 8.9 2022 06:06 AM    RBC 4.24 2022 06:06 AM    HGB 12.8 2022 06:06 AM    HCT 38.8 2022 06:06 AM    MCV 91.5 2022 06:06 AM    RDW 13.7 2022 06:06 AM     2022 06:06 AM       CMP:   Lab Results   Component Value Date/Time     2023 12:29 PM    K 4.3 2023 12:29 PM     2023 12:29 PM    CO2 29 2023 12:29 PM    BUN 9 01/20/2023 12:29 PM    CREATININE 0.87 01/20/2023 12:29 PM    LABGLOM >60 01/20/2023 12:29 PM    GLUCOSE 89 01/20/2023 12:29 PM    PROT 6.8 01/20/2023 12:29 PM    CALCIUM 9.7 01/20/2023 12:29 PM    BILITOT 0.2 01/20/2023 12:29 PM    ALKPHOS 86 01/20/2023 12:29 PM    AST 14 01/20/2023 12:29 PM    ALT 10 01/20/2023 12:29 PM       POC Tests: No results for input(s): POCGLU, POCNA, POCK, POCCL, POCBUN, POCHEMO, POCHCT in the last 72 hours. Coags: No results found for: PROTIME, INR, APTT    HCG (If Applicable): No results found for: PREGTESTUR, PREGSERUM, HCG, HCGQUANT     ABGs: No results found for: PHART, PO2ART, IJX1KAJ, HYD5GIN, BEART, [id number]     Type & Screen (If Applicable):  No results found for: LABABO, LABRH    Drug/Infectious Status (If Applicable):  No results found for: HIV, HEPCAB    COVID-19 Screening (If Applicable):   Lab Results   Component Value Date/Time    COVID19 Not Detected 11/18/2022 09:25 AM           Anesthesia Evaluation  Patient summary reviewed and Nursing notes reviewed  Airway: Mallampati: II  TM distance: >3 FB   Neck ROM: full  Mouth opening: > = 3 FB   Dental: normal exam         Pulmonary:normal exam    (+) COPD: no interval change,                             Cardiovascular:Negative CV ROS  Exercise tolerance: no interval change,                     Neuro/Psych:   (+) psychiatric history: stable with treatmentdepression/anxiety             GI/Hepatic/Renal:   (+) GERD: no interval change,           Endo/Other: Negative Endo/Other ROS                    Abdominal:             Vascular: Other Findings:           Anesthesia Plan      general     ASA 2       Induction: intravenous. Anesthetic plan and risks discussed with patient. Use of blood products discussed with patient whom.    Plan discussed with surgical team.                    LIZET Sepulveda - MELI   2/9/2023

## 2023-02-09 NOTE — ANESTHESIA POSTPROCEDURE EVALUATION
Department of Anesthesiology  Postprocedure Note    Patient: Chan Hammonds  MRN: [id number]  YOB: 1965  Date of evaluation: 2/9/2023      Procedure Summary     Date: 02/09/23 Room / Location: [address]    Anesthesia Start: 1302 Anesthesia Stop: 0686    Procedure: COLONOSCOPY POLYPECTOMY HOT BIOPSY Diagnosis:       Alternating constipation and diarrhea      (Alternating constipation and diarrhea [R19.8])    Surgeons: Rashida Downing DO Responsible Provider: LIZET Heck CRNA    Anesthesia Type: general ASA Status: 2          Anesthesia Type: No value filed.     Roney Phase I: Roney Score: 9    Roney Phase II:        Anesthesia Post Evaluation    Patient location during evaluation: bedside  Patient participation: complete - patient participated  Level of consciousness: awake and alert  Pain score: 0  Airway patency: patent  Nausea & Vomiting: no vomiting  Complications: no  Cardiovascular status: hemodynamically stable  Respiratory status: acceptable  Hydration status: euvolemic

## 2023-02-09 NOTE — OP NOTE
[facility] [address]                 [address] [place] [address]                                OPERATIVE REPORT    PATIENT NAME: Jennie Horne                      :        1965  MED REC NO:   [id number]                             ROOM:  ACCOUNT NO:   [de-identified]                           ADMIT DATE: 2023  PROVIDER:     Yakelin Schuster    DATE OF PROCEDURE:  2023    SURGEON:  Dr. Yakelin Schuster. ASSISTANT:  None. PREOPERATIVE DIAGNOSIS:  Screening. POSTOPERATIVE DIAGNOSES:  1.  Screening. 2.  Colon polyps. 3.  Redundant sigmoid colon. PROCEDURE:  Colonoscopy with hot snare and hot forceps polypectomies. ANESTHESIA:  MAC.    ESTIMATED BLOOD LOSS:  Minimal.    FLUIDS:  Per anesthesia record. COMPLICATIONS:  None. SPECIMENS:  1. Polyp at 40 cm removed with hot snare. 2.  Polyp at 30 cm removed with hot forceps. 3.  Polyp at 15 cm removed with hot snare. INDICATION FOR PROCEDURE:  The patient is a 60-year-old female referred  to my office for the above preoperative diagnosis. After evaluation,  decision was made to proceed with colonoscopy. Prior to the time of the  procedure, risks, benefits, and alternatives were explained to the  patient and consent was obtained. DESCRIPTION OF PROCEDURE:  The patient was brought to endoscopy suite,  kept on preoperative gurney and placed in the left lateral decubitus  position. Monitoring devices were placed. MAC anesthesia was induced. After induction of anesthesia, time-out was performed, and correct  patient and procedure were verified. Digital rectal exam was performed  which showed no abnormalities. The Olympus video endoscope was  lubricated, inserted into the patient's rectum which was gently  insufflated with air.   Scope was then slowly advanced through the colon  under visualization to the level of the cecum which was identified by  appendiceal orifice and ileocecal valve.  During advancement of the  scope, bowel prep was adequate. Scope was then slowly withdrawn through  the colon with withdrawal time being greater than 7 minutes. During  this time, colonic mucosa was carefully inspected. The patient did have  a few polyps as documented above which were removed with combination of  hot snare and hot forceps technique. Additionally, the patient has a  very redundant sigmoid colon, but no other abnormalities were noted. Once at the rectum, retroflex view was obtained which showed internal  hemorrhoids, but no other abnormalities. Scope was straightened and  removed, and procedure was concluded. At conclusion of the procedure,  the patient was in stable condition and was taken to the PACU for  recovery. PLAN:  I will follow up results of pathology and make final  recommendations at that time.         José Luis Fox    D: 02/09/2023 13:37:48       T: 02/09/2023 13:41:29     [id number]  Job#: [id number]     Doc#: [id number]    CC:  Primary Care

## 2023-02-10 PROBLEM — Z12.11 SCREEN FOR COLON CANCER: Status: ACTIVE | Noted: 2023-02-10

## 2023-02-10 PROBLEM — R19.8 ALTERNATING CONSTIPATION AND DIARRHEA: Status: ACTIVE | Noted: 2023-02-10

## 2023-02-13 LAB — SURGICAL PATHOLOGY REPORT: NORMAL

## 2023-02-15 ENCOUNTER — IMMUNIZATION (OUTPATIENT)
Dept: LAB | Age: 58
End: 2023-02-15
Payer: MEDICARE

## 2023-02-15 ENCOUNTER — OFFICE VISIT (OUTPATIENT)
Dept: FAMILY MEDICINE CLINIC | Age: 58
End: 2023-02-15
Payer: MEDICARE

## 2023-02-15 VITALS
BODY MASS INDEX: 20.66 KG/M2 | WEIGHT: 121 LBS | DIASTOLIC BLOOD PRESSURE: 86 MMHG | HEART RATE: 96 BPM | SYSTOLIC BLOOD PRESSURE: 138 MMHG | OXYGEN SATURATION: 98 % | HEIGHT: 64 IN

## 2023-02-15 DIAGNOSIS — F32.1 CURRENT MODERATE EPISODE OF MAJOR DEPRESSIVE DISORDER, UNSPECIFIED WHETHER RECURRENT (HCC): ICD-10-CM

## 2023-02-15 DIAGNOSIS — Z00.00 INITIAL MEDICARE ANNUAL WELLNESS VISIT: Primary | ICD-10-CM

## 2023-02-15 DIAGNOSIS — Z12.31 SCREENING MAMMOGRAM FOR BREAST CANCER: ICD-10-CM

## 2023-02-15 PROCEDURE — G0438 PPPS, INITIAL VISIT: HCPCS | Performed by: FAMILY MEDICINE

## 2023-02-15 PROCEDURE — 0134A COVID-19, MODERNA BIVALENT BOOSTER, (AGE 12Y+), IM, 50 MCG/0.5 ML: CPT | Performed by: FAMILY MEDICINE

## 2023-02-15 PROCEDURE — 99213 OFFICE O/P EST LOW 20 MIN: CPT | Performed by: FAMILY MEDICINE

## 2023-02-15 RX ORDER — ESCITALOPRAM OXALATE 10 MG/1
10 TABLET ORAL DAILY
Qty: 30 TABLET | Refills: 1 | Status: SHIPPED | OUTPATIENT
Start: 2023-02-15

## 2023-02-15 SDOH — ECONOMIC STABILITY: FOOD INSECURITY: WITHIN THE PAST 12 MONTHS, THE FOOD YOU BOUGHT JUST DIDN'T LAST AND YOU DIDN'T HAVE MONEY TO GET MORE.: PATIENT DECLINED

## 2023-02-15 SDOH — ECONOMIC STABILITY: HOUSING INSECURITY
IN THE LAST 12 MONTHS, WAS THERE A TIME WHEN YOU DID NOT HAVE A STEADY PLACE TO SLEEP OR SLEPT IN A SHELTER (INCLUDING NOW)?: PATIENT REFUSED

## 2023-02-15 SDOH — ECONOMIC STABILITY: FOOD INSECURITY: WITHIN THE PAST 12 MONTHS, YOU WORRIED THAT YOUR FOOD WOULD RUN OUT BEFORE YOU GOT MONEY TO BUY MORE.: PATIENT DECLINED

## 2023-02-15 SDOH — ECONOMIC STABILITY: INCOME INSECURITY: HOW HARD IS IT FOR YOU TO PAY FOR THE VERY BASICS LIKE FOOD, HOUSING, MEDICAL CARE, AND HEATING?: PATIENT DECLINED

## 2023-02-15 ASSESSMENT — PATIENT HEALTH QUESTIONNAIRE - PHQ9
SUM OF ALL RESPONSES TO PHQ9 QUESTIONS 1 & 2: 1
6. FEELING BAD ABOUT YOURSELF - OR THAT YOU ARE A FAILURE OR HAVE LET YOURSELF OR YOUR FAMILY DOWN: 0
SUM OF ALL RESPONSES TO PHQ QUESTIONS 1-9: 1
5. POOR APPETITE OR OVEREATING: 0
SUM OF ALL RESPONSES TO PHQ QUESTIONS 1-9: 1
3. TROUBLE FALLING OR STAYING ASLEEP: 0
10. IF YOU CHECKED OFF ANY PROBLEMS, HOW DIFFICULT HAVE THESE PROBLEMS MADE IT FOR YOU TO DO YOUR WORK, TAKE CARE OF THINGS AT HOME, OR GET ALONG WITH OTHER PEOPLE: 0
SUM OF ALL RESPONSES TO PHQ QUESTIONS 1-9: 1
SUM OF ALL RESPONSES TO PHQ QUESTIONS 1-9: 1
7. TROUBLE CONCENTRATING ON THINGS, SUCH AS READING THE NEWSPAPER OR WATCHING TELEVISION: 0
8. MOVING OR SPEAKING SO SLOWLY THAT OTHER PEOPLE COULD HAVE NOTICED. OR THE OPPOSITE, BEING SO FIGETY OR RESTLESS THAT YOU HAVE BEEN MOVING AROUND A LOT MORE THAN USUAL: 0
4. FEELING TIRED OR HAVING LITTLE ENERGY: 0
9. THOUGHTS THAT YOU WOULD BE BETTER OFF DEAD, OR OF HURTING YOURSELF: 0
2. FEELING DOWN, DEPRESSED OR HOPELESS: 1
1. LITTLE INTEREST OR PLEASURE IN DOING THINGS: 0

## 2023-02-15 ASSESSMENT — LIFESTYLE VARIABLES
HOW MANY STANDARD DRINKS CONTAINING ALCOHOL DO YOU HAVE ON A TYPICAL DAY: PATIENT DOES NOT DRINK
HOW OFTEN DO YOU HAVE A DRINK CONTAINING ALCOHOL: NEVER

## 2023-02-15 NOTE — PROGRESS NOTES
[address], [facility] [address]                        Telephone [phone]             Fax [phone]       Katia Bennett  :  1965  Age:  65 y.o. MRN:  [id number]  Date of visit:  2/15/2023       Assessment and Plan:    1. Initial Medicare annual wellness visit  See separate progress note for Medicare Wellness Visit. 2. Current moderate episode of major depressive disorder, unspecified whether recurrent ([facility] [address]  As noted, she had improvement in her mood with the Cymbalta, but she was unable to tolerate it do to side effects. She states that her daughter is doing well with Lexapro. Lexapro was prescribed:  - escitalopram (LEXAPRO) 10 MG tablet; Take 1 tablet by mouth daily  Dispense: 30 tablet; Refill: 1     3. Routine health maintenance  Health maintenance was reviewed with the patient. She has received three doses of the Pfizer Covid-19 vaccine. The updated bivalent Covid vaccine was recommended. Annual influenza vaccine was recommended. Shingrix and Tdap were recommended. She will schedule an appointment for a gyn exam and Pap test.   Hepatitis C and HIV screenings were recommended. Screening mammogram was recommended and ordered. Follow up instructions were given to the patient:  Return in 2 months (on 4/15/2023) for Pap/annual exam, depression. Subjective:    Katia Bennett is a 65 y.o. female who presents to [facility] today (2/15/2023) for follow up/evaluation of:  Depression (3 week follow up- started on Cymbalta- stopped due to constipation ), COPD (Requesting inhaler and also refill of albuterol for nebulizer), and Medicare AWV      She is here today for a Medicare Wellness Visit, as well as follow up of depression. At her last visit with me on 2023, Cymbalta was prescribed for treatment of depression.    She states that she had improvement in her mood, but the Cymbalta caused constipation, and she discontinued it. She has received three doses of the Pfizer Covid-19 vaccine. The most recent dose was 6/23/2022. Immunization history was reviewed:  Immunization History   Administered Date(s) Administered    COVID-19, PFIZER GRAY top, DO NOT Dilute, (age 15 y+), IM, 30 mcg/0.3 mL 06/23/2022    COVID-19, PFIZER PURPLE top, DILUTE for use, (age 15 y+), 30mcg/0.3mL 04/21/2021, 08/20/2021    Influenza Virus Vaccine 10/18/2013    Pneumococcal Polysaccharide ([id number]) 01/29/2013    Tdap (Boostrix, Adacel) 01/29/2013          She has the following problem list:  Patient Active Problem List   Diagnosis    Small bowel obstruction (HCC)    Chronic obstructive pulmonary disease (HCC)    Gastroesophageal reflux disease    Anxiety disorder    Depression    Pain of both hip joints    Backache    Tobacco use    Constipation    Screen for colon cancer    Alternating constipation and diarrhea        Current medications are:  Outpatient Medications Marked as Taking for the 2/15/23 encounter (Office Visit) with Princess Larios MD   Medication Sig Dispense Refill    gabapentin (NEURONTIN) 800 MG tablet Take 800 mg by mouth daily. She is allergic to latex, aleve [naproxen], naproxen sodium, sudafed [pseudoephedrine], meloxicam, and cymbalta [duloxetine hcl]. She is a smoker. She  reports that she has been smoking cigarettes. She has a 25.00 pack-year smoking history. She does not have any smokeless tobacco history on file. Objective:    Vitals:    02/15/23 1347   BP: 138/86   Site: Right Upper Arm   Position: Sitting   Cuff Size: Large Adult   Pulse: 96   SpO2: 98%   Weight: 121 lb (54.9 kg)   Height: 5' 4\" (1.626 m)     Body mass index is 20.77 kg/m². Well-nourished, well-developed female, healthy-appearing, alert, cooperative, and in no acute distress. Neck supple. No adenopathy. Thyroid symmetric, normal size. Chest:  Normal expansion. Clear to auscultation. No rales, rhonchi, or wheezing. Heart sounds are normal.  Regular rate and rhythm without murmur, gallop or rub. Lower extremities have no edema. Affect is bright. Thought processes are logical. There is no evidence of paranoia or delusions. Responses to questions are appropriate. Dress and grooming are appropriate.      PHQ Scores 2/15/2023 1/20/2023   PHQ2 Score 1 2   PHQ9 Score 1 8     Interpretation of Total Score Depression Severity: 1-4 = Minimal depression, 5-9 = Mild depression, 10-14 = Moderate depression, 15-19 = Moderately severe depression, 20-27 = Severe depression                   (Please note that portions of this note were completed with a voice-recognition program. Efforts were made to edit the dictation but occasionally words are mis-transcribed.)

## 2023-02-15 NOTE — PROGRESS NOTES
Patient will set up pap for next OV. Patient declines shingles vaccine, tdap vaccine, HIV screen, Hepatitis c screen.     Patient agreeable to flu and covid vaccine

## 2023-02-15 NOTE — PROGRESS NOTES
Medicare Annual Wellness Visit    Renetta Zhao is here for Depression (3 week follow up- started on Cymbalta- stopped due to constipation ), COPD (Requesting inhaler and also refill of albuterol for nebulizer), and Medicare AWV    Assessment & Plan   Screening mammogram for breast cancer    Recommendations for Preventive Services Due: see orders and patient instructions/AVS.  Recommended screening schedule for the next 5-10 years is provided to the patient in written form: see Patient Instructions/AVS.     No follow-ups on file. Subjective       Patient's complete Health Risk Assessment and screening values have been reviewed and are found in Flowsheets. The following problems were reviewed today and where indicated follow up appointments were made and/or referrals ordered. Positive Risk Factor Screenings with Interventions:          Drug Use:          Interpretation:  1-2: Low level - Monitor, re-assess at a later date  3-5: Moderate level - Further Investigation  6-8: Substantial level - Intensive Assessment  9-10: Severe level - Intensive Assessment    Interventions:  Patient comments: She states that she used marijuana approximately twice a day. Vision Screen:  Do you have difficulty driving, watching TV, or doing any of your daily activities because of your eyesight?: (!) Yes  Have you had an eye exam within the past year?: (!) No  No results found.     Interventions:   Patient encouraged to make appointment with their eye specialist      Advanced Directives:  Do you have a Living Will?: (!) No    Intervention:  has NO advanced directive - information provided        Tobacco Use:  Tobacco Use: High Risk    Smoking Tobacco Use: Every Day    Smokeless Tobacco Use: Unknown    Passive Exposure: Not on file     E-cigarette/Vaping       Questions Responses    E-cigarette/Vaping Use Never User    Start Date     Passive Exposure     Quit Date     Counseling Given     Comments Interventions:  See AVS for additional education material                        Objective   Vitals:    02/15/23 1347   BP: 138/86   Site: Right Upper Arm   Position: Sitting   Cuff Size: Large Adult   Pulse: 96   SpO2: 98%   Weight: 121 lb (54.9 kg)   Height: 5' 4\" (1.626 m)      Body mass index is 20.77 kg/m². Allergies   Allergen Reactions    Latex Itching    Aleve [Naproxen] Shortness Of Breath    Naproxen Sodium Itching    Sudafed [Pseudoephedrine] Shortness Of Breath    Meloxicam Itching    Cymbalta [Duloxetine Hcl] Other (See Comments)     Constipation      Prior to Visit Medications    Medication Sig Taking? Authorizing Provider   gabapentin (NEURONTIN) 800 MG tablet Take 800 mg by mouth daily.  Yes Historical Provider, MD Escamilla (Including outside providers/suppliers regularly involved in providing care):   Patient Care Team:  Princess Larios MD as PCP - General (Family Medicine)  Princess Larios MD as PCP - Empaneled Provider     Reviewed and updated this visit:  Tobacco  Allergies  Meds  Med Hx  Surg Hx  Soc Hx  Fam Hx             Princess Larios MD

## 2023-02-15 NOTE — PATIENT INSTRUCTIONS
Learning About Vision Tests  What are vision tests? The four most common vision tests are visual acuity tests, refraction, visual field tests, and color vision tests. Visual acuity (sharpness) tests  These tests are used: To see if you need glasses or contact lenses. To monitor an eye problem. To check an eye injury. Visual acuity tests are done as part of routine exams. You may also have this test when you get your 's license or apply for some types of jobs. Visual field tests  These tests are used: To check for vision loss in any area of your range of vision. To screen for certain eye diseases. To look for nerve damage after a stroke, head injury, or other problem that could reduce blood flow to the brain. Refraction and color tests  A refraction test is done to find the right prescription for glasses and contact lenses. A color vision test is done to check for color blindness. Color vision is often tested as part of a routine exam. You may also have this test when you apply for a job where recognizing different colors is important, such as , electronics, or the [employer]. How are vision tests done? Visual acuity test   You cover one eye at a time. You read aloud from a wall chart across the room. You read aloud from a small card that you hold in your hand. Refraction   You look into a special device. The device puts lenses of different strengths in front of each eye to see how strong your glasses or contact lenses need to be. Visual field tests   Your doctor may have you look through special machines. Or your doctor may simply have you stare straight ahead while they move a finger into and out of your field of vision. Color vision test   You look at pieces of printed test patterns in various colors. You say what number or symbol you see. Your doctor may have you trace the number or symbol using a pointer. How do these tests feel?   There is very little chance of having a problem from this test. If dilating drops are used for a vision test, they may make the eyes sting and cause a medicine taste in the mouth. Follow-up care is a key part of your treatment and safety. Be sure to make and go to all appointments, and call your doctor if you are having problems. It's also a good idea to know your test results and keep a list of the medicines you take. Where can you learn more? Go to http://www.stauffer.com/ and enter G551 to learn more about \"Learning About Vision Tests. \"  Current as of: October 12, 2022               Content Version: 13.5  © 2983-2673 [employer]. Care instructions adapted under license by [address] If you have questions about a medical condition or this instruction, always ask your healthcare professional. [address] any warranty or liability for your use of this information. Advance Directives: Care Instructions  Overview  An advance directive is a legal way to state your wishes at the end of your life. It tells your family and your doctor what to do if you can't say what you want. There are two main types of advance directives. You can change them any time your wishes change. Living will. This form tells your family and your doctor your wishes about life support and other treatment. The form is also called a declaration. Medical power of . This form lets you name a person to make treatment decisions for you when you can't speak for yourself. This person is called a health care agent (health care proxy, health care surrogate). The form is also called a durable power of  for health care. If you do not have an advance directive, decisions about your medical care may be made by a family member, or by a doctor or a  who doesn't know you. It may help to think of an advance directive as a gift to the people who care for you.  If you have one, they won't have to make tough decisions by themselves. For more information, including forms for your state, see the [address] website (www.caringinfo.org/planning/advance-directives/). Follow-up care is a key part of your treatment and safety. Be sure to make and go to all appointments, and call your doctor if you are having problems. It's also a good idea to know your test results and keep a list of the medicines you take. What should you include in an advance directive? Many states have a unique advance directive form. (It may ask you to address specific issues.) Or you might use a universal form that's approved by many states. If your form doesn't tell you what to address, it may be hard to know what to include in your advance directive. Use the questions below to help you get started. Who do you want to make decisions about your medical care if you are not able to? What life-support measures do you want if you have a serious illness that gets worse over time or can't be cured? What are you most afraid of that might happen? (Maybe you're afraid of having pain, losing your independence, or being kept alive by machines.)  Where would you prefer to die? (Your home? A hospital? A nursing home?)  Do you want to donate your organs when you die? Do you want certain Jain practices performed before you die? When should you call for help? Be sure to contact your doctor if you have any questions. Where can you learn more? Go to http://www.stauffer.com/ and enter R264 to learn more about \"Advance Directives: Care Instructions. \"  Current as of: June 16, 2022               Content Version: 13.5  © 3425-8046 [employer]. Care instructions adapted under license by [facility] ([facility]). If you have questions about a medical condition or this instruction, always ask your healthcare professional. [address] any warranty or liability for your use of this information.            A Healthy Heart: Care Instructions  Your Care Instructions     Coronary artery disease, also called heart disease, occurs when a substance called plaque builds up in the vessels that supply oxygen-rich blood to your heart muscle. This can narrow the blood vessels and reduce blood flow. A heart attack happens when blood flow is completely blocked. A high-fat diet, smoking, and other factors increase the risk of heart disease. Your doctor has found that you have a chance of having heart disease. You can do lots of things to keep your heart healthy. It may not be easy, but you can change your diet, exercise more, and quit smoking. These steps really work to lower your chance of heart disease. Follow-up care is a key part of your treatment and safety. Be sure to make and go to all appointments, and call your doctor if you are having problems. It's also a good idea to know your test results and keep a list of the medicines you take. How can you care for yourself at home? Diet    Use less salt when you cook and eat. This helps lower your blood pressure. Taste food before salting. Add only a little salt when you think you need it. With time, your taste buds will adjust to less salt.     Eat fewer snack items, fast foods, canned soups, and other high-salt, high-fat, processed foods.     Read food labels and try to avoid saturated and trans fats. They increase your risk of heart disease by raising cholesterol levels.     Limit the amount of solid fat-butter, margarine, and shortening-you eat. Use olive, peanut, or canola oil when you cook. Bake, broil, and steam foods instead of frying them.     Eat a variety of fruit and vegetables every day. Dark green, deep orange, red, or yellow fruits and vegetables are especially good for you. Examples include spinach, carrots, peaches, and berries.     Foods high in fiber can reduce your cholesterol and provide important vitamins and minerals.  High-fiber foods include whole-grain cereals and breads, oatmeal, beans, brown rice, citrus fruits, and apples.     Eat lean proteins. Heart-healthy proteins include seafood, lean meats and poultry, eggs, beans, peas, nuts, seeds, and soy products.     Limit drinks and foods with added sugar. These include candy, desserts, and soda pop. Lifestyle changes    If your doctor recommends it, get more exercise. Walking is a good choice. Bit by bit, increase the amount you walk every day. Try for at least 30 minutes on most days of the week. You also may want to swim, bike, or do other activities.     Do not smoke. If you need help quitting, talk to your doctor about stop-smoking programs and medicines. These can increase your chances of quitting for good. Quitting smoking may be the most important step you can take to protect your heart. It is never too late to quit.     Limit alcohol to 2 drinks a day for men and 1 drink a day for women. Too much alcohol can cause health problems.     Manage other health problems such as diabetes, high blood pressure, and high cholesterol. If you think you may have a problem with alcohol or drug use, talk to your doctor. Medicines    Take your medicines exactly as prescribed. Call your doctor if you think you are having a problem with your medicine.     If your doctor recommends aspirin, take the amount directed each day. Make sure you take aspirin and not another kind of pain reliever, such as acetaminophen (Tylenol). When should you call for help? Call 911 if you have symptoms of a heart attack. These may include:    Chest pain or pressure, or a strange feeling in the chest.     Sweating.     Shortness of breath.     Pain, pressure, or a strange feeling in the back, neck, jaw, or upper belly or in one or both shoulders or arms.     Lightheadedness or sudden weakness.     A fast or irregular heartbeat. After you call 911, the  may tell you to chew 1 adult-strength or 2 to 4 low-dose aspirin. Wait for an ambulance.  Do not try to drive yourself. Watch closely for changes in your health, and be sure to contact your doctor if you have any problems. Where can you learn more? Go to http://www.stauffer.com/ and enter F075 to learn more about \"A Healthy Heart: Care Instructions. \"  Current as of: September 7, 2022               Content Version: 13.5  © 2006-2022 [employer]. Care instructions adapted under license by [facility] ([facility]). If you have questions about a medical condition or this instruction, always ask your healthcare professional. [address] any warranty or liability for your use of this information. Personalized Preventive Plan for Jayde León - 2/15/2023  Medicare offers a range of preventive health benefits. Some of the tests and screenings are paid in full while other may be subject to a deductible, co-insurance, and/or copay. Some of these benefits include a comprehensive review of your medical history including lifestyle, illnesses that may run in your family, and various assessments and screenings as appropriate. After reviewing your medical record and screening and assessments performed today your provider may have ordered immunizations, labs, imaging, and/or referrals for you. A list of these orders (if applicable) as well as your Preventive Care list are included within your After Visit Summary for your review. Other Preventive Recommendations:    A preventive eye exam performed by an eye specialist is recommended every 1-2 years to screen for glaucoma; cataracts, macular degeneration, and other eye disorders. A preventive dental visit is recommended every 6 months. Try to get at least 150 minutes of exercise per week or 10,000 steps per day on a pedometer . Order or download the FREE \"Exercise & Physical Activity: Your Everyday Guide\" from [employer]. Call 7-392.994.9099 or search [employer] online.   You need 2785-7570 mg of calcium and 8198-2710 IU of vitamin D per day. It is possible to meet your calcium requirement with diet alone, but a vitamin D supplement is usually necessary to meet this goal.  When exposed to the sun, use a sunscreen that protects against both UVA and UVB radiation with an SPF of 30 or greater. Reapply every 2 to 3 hours or after sweating, drying off with a towel, or swimming. Always wear a seat belt when traveling in a car. Always wear a helmet when riding a bicycle or motorcycle.

## 2023-03-12 PROBLEM — Z12.11 SCREEN FOR COLON CANCER: Status: RESOLVED | Noted: 2023-02-10 | Resolved: 2023-03-12

## 2023-05-04 ENCOUNTER — HOSPITAL ENCOUNTER (OUTPATIENT)
Age: 58
Setting detail: SPECIMEN
Discharge: HOME | End: 2023-05-04
Payer: MEDICARE

## 2023-05-04 ENCOUNTER — OFFICE VISIT (OUTPATIENT)
Dept: FAMILY MEDICINE CLINIC | Age: 58
End: 2023-05-04

## 2023-05-04 ENCOUNTER — HOSPITAL ENCOUNTER (OUTPATIENT)
Dept: GENERAL RADIOLOGY | Age: 58
Discharge: HOME | End: 2023-05-06
Payer: MEDICARE

## 2023-05-04 VITALS
BODY MASS INDEX: 20.66 KG/M2 | HEART RATE: 105 BPM | SYSTOLIC BLOOD PRESSURE: 132 MMHG | DIASTOLIC BLOOD PRESSURE: 78 MMHG | HEIGHT: 64 IN | OXYGEN SATURATION: 98 % | TEMPERATURE: 98.2 F | WEIGHT: 121 LBS

## 2023-05-04 DIAGNOSIS — F32.1 CURRENT MODERATE EPISODE OF MAJOR DEPRESSIVE DISORDER, UNSPECIFIED WHETHER RECURRENT (HCC): ICD-10-CM

## 2023-05-04 DIAGNOSIS — Z87.891 PERSONAL HISTORY OF TOBACCO USE, PRESENTING HAZARDS TO HEALTH: ICD-10-CM

## 2023-05-04 DIAGNOSIS — J44.9 CHRONIC OBSTRUCTIVE PULMONARY DISEASE, UNSPECIFIED COPD TYPE (HCC): ICD-10-CM

## 2023-05-04 DIAGNOSIS — M25.531 PAIN IN RIGHT WRIST: ICD-10-CM

## 2023-05-04 DIAGNOSIS — Z01.419 WELL FEMALE EXAM WITH ROUTINE GYNECOLOGICAL EXAM: Primary | ICD-10-CM

## 2023-05-04 DIAGNOSIS — Z12.4 SCREENING FOR CERVICAL CANCER: ICD-10-CM

## 2023-05-04 PROBLEM — K56.609 SMALL BOWEL OBSTRUCTION (HCC): Status: RESOLVED | Noted: 2022-11-18 | Resolved: 2023-05-04

## 2023-05-04 PROBLEM — F32.9 MAJOR DEPRESSIVE DISORDER: Status: ACTIVE | Noted: 2017-03-13

## 2023-05-04 PROCEDURE — 87624 HPV HI-RISK TYP POOLED RSLT: CPT

## 2023-05-04 PROCEDURE — G0145 SCR C/V CYTO,THINLAYER,RESCR: HCPCS

## 2023-05-04 PROCEDURE — 73110 X-RAY EXAM OF WRIST: CPT

## 2023-05-04 RX ORDER — BUDESONIDE, GLYCOPYRROLATE, AND FORMOTEROL FUMARATE 160; 9; 4.8 UG/1; UG/1; UG/1
2 AEROSOL, METERED RESPIRATORY (INHALATION) 2 TIMES DAILY
Qty: 1 EACH | Refills: 3 | Status: SHIPPED | OUTPATIENT
Start: 2023-05-04

## 2023-05-04 RX ORDER — ESCITALOPRAM OXALATE 20 MG/1
20 TABLET ORAL DAILY
Qty: 30 TABLET | Refills: 2 | Status: SHIPPED | OUTPATIENT
Start: 2023-05-04 | End: 2023-06-03

## 2023-05-04 RX ORDER — ALBUTEROL SULFATE 90 UG/1
2 AEROSOL, METERED RESPIRATORY (INHALATION) EVERY 4 HOURS PRN
Qty: 18 G | Refills: 1 | Status: SHIPPED | OUTPATIENT
Start: 2023-05-04

## 2023-05-04 ASSESSMENT — PATIENT HEALTH QUESTIONNAIRE - PHQ9
4. FEELING TIRED OR HAVING LITTLE ENERGY: 0
SUM OF ALL RESPONSES TO PHQ QUESTIONS 1-9: 1
SUM OF ALL RESPONSES TO PHQ QUESTIONS 1-9: 1
9. THOUGHTS THAT YOU WOULD BE BETTER OFF DEAD, OR OF HURTING YOURSELF: 0
SUM OF ALL RESPONSES TO PHQ QUESTIONS 1-9: 1
10. IF YOU CHECKED OFF ANY PROBLEMS, HOW DIFFICULT HAVE THESE PROBLEMS MADE IT FOR YOU TO DO YOUR WORK, TAKE CARE OF THINGS AT HOME, OR GET ALONG WITH OTHER PEOPLE: 0
SUM OF ALL RESPONSES TO PHQ9 QUESTIONS 1 & 2: 0
SUM OF ALL RESPONSES TO PHQ QUESTIONS 1-9: 1
6. FEELING BAD ABOUT YOURSELF - OR THAT YOU ARE A FAILURE OR HAVE LET YOURSELF OR YOUR FAMILY DOWN: 0
8. MOVING OR SPEAKING SO SLOWLY THAT OTHER PEOPLE COULD HAVE NOTICED. OR THE OPPOSITE, BEING SO FIGETY OR RESTLESS THAT YOU HAVE BEEN MOVING AROUND A LOT MORE THAN USUAL: 0
1. LITTLE INTEREST OR PLEASURE IN DOING THINGS: 0
2. FEELING DOWN, DEPRESSED OR HOPELESS: 0
3. TROUBLE FALLING OR STAYING ASLEEP: 1
7. TROUBLE CONCENTRATING ON THINGS, SUCH AS READING THE NEWSPAPER OR WATCHING TELEVISION: 0
5. POOR APPETITE OR OVEREATING: 0

## 2023-05-04 NOTE — PROGRESS NOTES
[address], [address] [place] [address] Drive                        Telephone [phone]             Fax [phone]      Sadny Siddiqui  :  1965  Age:  65 y.o. MRN:  [id number]  Date of visit:  2023         Assessment and Plan:    1. Well female exam with routine gynecological exam  2. Screening for cervical cancer  Pap smear was obtained today using the Thin Prep method. She will be contacted with results. I discussed with the patient that I did not visualize or palpate a cervix on her exam today. A scraping was obtained of the vaginal cuff. She has an order for a mammogram.   She was encouraged to schedule this. - PAP SMEAR; Future    3. Current moderate episode of major depressive disorder, unspecified whether recurrent ([facility]  She is tolerating Lexapro well. As noted, she would like to try an increased dose. She was advised to increase the dose of Lexapro from 10 mg daily to 20 mg daily:  - escitalopram (LEXAPRO) 20 MG tablet; Take 1 tablet by mouth daily  Dispense: 30 tablet; Refill: 2    4. Chronic obstructive pulmonary disease, unspecified COPD type ([facility]  Smoking cessation was advised. I recommended beginning Breztri daily, with Albuterol prn:  - Budeson-Glycopyrrol-Formoterol (BREZTRI AEROSPHERE) 160-9-4.8 MCG/ACT AERO; Inhale 2 inhalations into the lungs in the morning and at bedtime  Dispense: 1 each; Refill: 3  - albuterol sulfate HFA (PROVENTIL;VENTOLIN;PROAIR) 108 (90 Base) MCG/ACT inhaler; Inhale 2 puffs into the lungs every 4 hours as needed for Wheezing or Shortness of Breath  Dispense: 18 g; Refill: 1    5. Personal history of tobacco use, presenting hazards to health   Tobacco Cessation Counseling: Patient advised about behavior change, including information about personal health harms, usage of appropriate cessation measures and benefits of cessation.   Time spent (minutes): 3  - DE

## 2023-05-04 NOTE — PROGRESS NOTES
Chaperone for Intimate Exam  Chaperone was offered and accepted as part of the rooming process.   Chaperone: Joel Collins LPN

## 2023-05-06 LAB
HPV SAMPLE: NORMAL
HPV, GENOTYPE 16: NOT DETECTED
HPV, GENOTYPE 18: NOT DETECTED
HPV, HIGH RISK OTHER: NOT DETECTED
HPV, INTERPRETATION: NORMAL
SPECIMEN DESCRIPTION: NORMAL

## 2023-05-10 LAB — CYTOLOGY REPORT: NORMAL

## 2023-05-12 ENCOUNTER — PATIENT MESSAGE (OUTPATIENT)
Dept: FAMILY MEDICINE CLINIC | Age: 58
End: 2023-05-12

## 2023-05-12 DIAGNOSIS — M25.531 PAIN IN RIGHT WRIST: Primary | ICD-10-CM

## 2023-05-16 ENCOUNTER — TELEPHONE (OUTPATIENT)
Dept: FAMILY MEDICINE CLINIC | Age: 58
End: 2023-05-16

## 2023-05-16 NOTE — TELEPHONE ENCOUNTER
----- Message from Harleen Colbert MD sent at 5/10/2023  5:53 PM EDT -----  Please advise Richard Villaseñor that her Pap test had no abnormal cells. She will be due for an annual exam in one year, but she does not need any additional Pap tests.

## 2023-05-31 RX ORDER — METHYLPREDNISOLONE 4 MG/1
TABLET ORAL
Qty: 1 KIT | Refills: 0 | Status: SHIPPED | OUTPATIENT
Start: 2023-05-31 | End: 2023-06-06

## 2023-05-31 NOTE — TELEPHONE ENCOUNTER
Patient aware of results and agreeable to trying steroids for the pain. Patient uses [facility] pharmacy in [place].

## 2023-07-06 ENCOUNTER — TELEPHONE (OUTPATIENT)
Dept: FAMILY MEDICINE CLINIC | Age: 58
End: 2023-07-06

## 2023-07-06 NOTE — TELEPHONE ENCOUNTER
Attempted to reach patient regarding missed appointment on 07/06/2023. Unable to contact at this time. Left a VM to reschedule.

## 2023-07-11 ENCOUNTER — TELEPHONE (OUTPATIENT)
Dept: ONCOLOGY | Age: 58
End: 2023-07-11

## 2023-08-28 DIAGNOSIS — F32.1 CURRENT MODERATE EPISODE OF MAJOR DEPRESSIVE DISORDER, UNSPECIFIED WHETHER RECURRENT (HCC): ICD-10-CM

## 2023-08-28 NOTE — TELEPHONE ENCOUNTER
Param Andino called requesting a refill of the below medication which has been pended for you:     Requested Prescriptions     Pending Prescriptions Disp Refills    escitalopram (LEXAPRO) 20 MG tablet 60 tablet 0     Sig: Take 1 tablet by mouth daily       Last Appointment Date: 5/4/2023  Next Appointment Date: 10/10/2023    Allergies   Allergen Reactions    Latex Itching    Aleve [Naproxen] Shortness Of Breath    Naproxen Sodium Itching    Sudafed [Pseudoephedrine] Shortness Of Breath    Meloxicam Itching    Cymbalta [Duloxetine Hcl] Other (See Comments)     Constipation

## 2023-08-31 RX ORDER — ESCITALOPRAM OXALATE 20 MG/1
20 TABLET ORAL DAILY
Qty: 30 TABLET | Refills: 0 | Status: SHIPPED | OUTPATIENT
Start: 2023-08-31 | End: 2023-09-30

## 2023-09-07 ENCOUNTER — TELEPHONE (OUTPATIENT)
Dept: FAMILY MEDICINE CLINIC | Age: 58
End: 2023-09-07

## 2023-09-07 NOTE — TELEPHONE ENCOUNTER
Attempted to reach patient regarding missed appointment on 9/7/2023. Unable to contact at this time. Left message to reschedule.

## 2023-10-09 DIAGNOSIS — F32.1 CURRENT MODERATE EPISODE OF MAJOR DEPRESSIVE DISORDER, UNSPECIFIED WHETHER RECURRENT (HCC): ICD-10-CM

## 2023-10-09 RX ORDER — ESCITALOPRAM OXALATE 20 MG/1
20 TABLET ORAL DAILY
Qty: 30 TABLET | Refills: 0 | OUTPATIENT
Start: 2023-10-09 | End: 2023-11-08

## 2023-10-09 NOTE — TELEPHONE ENCOUNTER
Rosie Ochoa called requesting a refill of the below medication which has been pended for you:     Requested Prescriptions     Pending Prescriptions Disp Refills    escitalopram (LEXAPRO) 20 MG tablet 30 tablet 0     Sig: Take 1 tablet by mouth daily       Last Appointment Date: 5/4/2023  Next Appointment Date: Visit date not found    Allergies   Allergen Reactions    Latex Itching    Aleve [Naproxen] Shortness Of Breath    Naproxen Sodium Itching    Sudafed [Pseudoephedrine] Shortness Of Breath    Meloxicam Itching    Cymbalta [Duloxetine Hcl] Other (See Comments)     Constipation

## 2023-12-01 ENCOUNTER — TELEPHONE (OUTPATIENT)
Dept: FAMILY MEDICINE CLINIC | Age: 58
End: 2023-12-01

## 2023-12-01 NOTE — TELEPHONE ENCOUNTER
Patient requesting CT of abd/pelvis to be done at [facility]. Patient states she is no longer allowed on our property.

## 2023-12-06 NOTE — TELEPHONE ENCOUNTER
Pt returning call, pt advised of CT ordered at [facility] and pt will call to schedule.

## 2024-10-18 ENCOUNTER — TELEPHONE (OUTPATIENT)
Dept: FAMILY MEDICINE CLINIC | Age: 59
End: 2024-10-18

## 2024-10-18 NOTE — TELEPHONE ENCOUNTER
Patient called in and wanted to scheduled with RR. Writer looked at documentation after patient stated that she is allowed on property again. Writer called back to nurse cydney and she stated to get a hold of security and find out if this information is true. Writer told patient she needed to speak with officers at [facility] to determine patient status. Writer found officer mingo and told him about situation. Officer took patient name and number and said he would check in with opal and get back to . Officer called patient to inform her that she is not allowed on property as a patient or allowed to get out of car. Patient is only allowed to drop of  for appointments and then she has to leave the premises. Patient is not allowed to be patient at [facility], as she has been dismissed indefinitely.

## (undated) DEVICE — MERCY DEFIANCE ENDO KIT: Brand: MEDLINE INDUSTRIES, INC.

## (undated) DEVICE — FORCEPS BX L240CM JAW DIA2.2MM RAD JAW 4 HOT DISP

## (undated) DEVICE — 9165 UNIVERSAL PATIENT PLATE: Brand: 3M™

## (undated) DEVICE — SNARE ENDOSCP L240CM SHTH DIA2.4MM LOOP W20MM MIN WRK CHN

## (undated) DEVICE — CONNECTOR TBNG AUX H2O JET DISP FOR OLY 160/180 SER

## (undated) DEVICE — LINE SAMP O2 6.5FT W/FEMALE CONN F/ADULT CAPNOLINE PLUS

## (undated) DEVICE — 60 ML SYRINGE REGULAR TIP: Brand: MONOJECT

## (undated) DEVICE — 4-PORT MANIFOLD: Brand: NEPTUNE 2